# Patient Record
Sex: MALE | Race: WHITE | NOT HISPANIC OR LATINO
[De-identification: names, ages, dates, MRNs, and addresses within clinical notes are randomized per-mention and may not be internally consistent; named-entity substitution may affect disease eponyms.]

---

## 2018-09-06 ENCOUNTER — RESULT REVIEW (OUTPATIENT)
Age: 73
End: 2018-09-06

## 2018-09-06 ENCOUNTER — INPATIENT (INPATIENT)
Facility: HOSPITAL | Age: 73
LOS: 3 days | Discharge: HOME | End: 2018-09-10
Attending: INTERNAL MEDICINE | Admitting: INTERNAL MEDICINE
Payer: MEDICARE

## 2018-09-06 VITALS — WEIGHT: 169.98 LBS | HEIGHT: 66 IN

## 2018-09-06 DIAGNOSIS — Z96.642 PRESENCE OF LEFT ARTIFICIAL HIP JOINT: Chronic | ICD-10-CM

## 2018-09-06 LAB
ALBUMIN SERPL ELPH-MCNC: 2.8 G/DL — LOW (ref 3.3–5)
ALP SERPL-CCNC: 227 U/L — HIGH (ref 40–120)
ALT FLD-CCNC: 63 U/L — SIGNIFICANT CHANGE UP (ref 12–78)
ANION GAP SERPL CALC-SCNC: 13 MMOL/L — SIGNIFICANT CHANGE UP (ref 5–17)
APPEARANCE UR: CLEAR — SIGNIFICANT CHANGE UP
APTT BLD: 31.7 SEC — SIGNIFICANT CHANGE UP (ref 27.5–37.4)
AST SERPL-CCNC: 44 U/L — HIGH (ref 15–37)
B PERT IGG+IGM PNL SER: ABNORMAL
BASOPHILS # BLD AUTO: 0.02 K/UL — SIGNIFICANT CHANGE UP (ref 0–0.2)
BASOPHILS NFR BLD AUTO: 0.2 % — SIGNIFICANT CHANGE UP (ref 0–2)
BILIRUB SERPL-MCNC: 1.1 MG/DL — SIGNIFICANT CHANGE UP (ref 0.2–1.2)
BILIRUB UR-MCNC: ABNORMAL
BUN SERPL-MCNC: 23 MG/DL — SIGNIFICANT CHANGE UP (ref 7–23)
CALCIUM SERPL-MCNC: 9.7 MG/DL — SIGNIFICANT CHANGE UP (ref 8.5–10.1)
CHLORIDE SERPL-SCNC: 101 MMOL/L — SIGNIFICANT CHANGE UP (ref 96–108)
CO2 SERPL-SCNC: 25 MMOL/L — SIGNIFICANT CHANGE UP (ref 22–31)
COLOR FLD: YELLOW — SIGNIFICANT CHANGE UP
COLOR SPEC: YELLOW — SIGNIFICANT CHANGE UP
CREAT SERPL-MCNC: 1.34 MG/DL — HIGH (ref 0.5–1.3)
CRP SERPL-MCNC: 27.26 MG/DL — HIGH (ref 0–0.4)
DIFF PNL FLD: ABNORMAL
EOSINOPHIL # BLD AUTO: 0.02 K/UL — SIGNIFICANT CHANGE UP (ref 0–0.5)
EOSINOPHIL NFR BLD AUTO: 0.2 % — SIGNIFICANT CHANGE UP (ref 0–6)
ERYTHROCYTE [SEDIMENTATION RATE] IN BLOOD: 78 MM/HR — HIGH (ref 0–20)
FLUID INTAKE SUBSTANCE CLASS: SIGNIFICANT CHANGE UP
FLUID SEGMENTED GRANULOCYTES: 94 % — SIGNIFICANT CHANGE UP
GLUCOSE SERPL-MCNC: 105 MG/DL — HIGH (ref 70–99)
GLUCOSE UR QL: NEGATIVE MG/DL — SIGNIFICANT CHANGE UP
GRAM STN FLD: SIGNIFICANT CHANGE UP
HCT VFR BLD CALC: 43.7 % — SIGNIFICANT CHANGE UP (ref 39–50)
HGB BLD-MCNC: 14.4 G/DL — SIGNIFICANT CHANGE UP (ref 13–17)
IMM GRANULOCYTES NFR BLD AUTO: 0.6 % — SIGNIFICANT CHANGE UP (ref 0–1.5)
INR BLD: 1.19 RATIO — HIGH (ref 0.88–1.16)
KETONES UR-MCNC: ABNORMAL
LACTATE SERPL-SCNC: 2.1 MMOL/L — HIGH (ref 0.7–2)
LACTATE SERPL-SCNC: 2.6 MMOL/L — HIGH (ref 0.7–2)
LEUKOCYTE ESTERASE UR-ACNC: ABNORMAL
LYMPHOCYTES # BLD AUTO: 0.84 K/UL — LOW (ref 1–3.3)
LYMPHOCYTES # BLD AUTO: 8.9 % — LOW (ref 13–44)
LYMPHOCYTES # FLD: 2 % — SIGNIFICANT CHANGE UP
MCHC RBC-ENTMCNC: 27.1 PG — SIGNIFICANT CHANGE UP (ref 27–34)
MCHC RBC-ENTMCNC: 33 GM/DL — SIGNIFICANT CHANGE UP (ref 32–36)
MCV RBC AUTO: 82.1 FL — SIGNIFICANT CHANGE UP (ref 80–100)
MONOCYTES # BLD AUTO: 0.88 K/UL — SIGNIFICANT CHANGE UP (ref 0–0.9)
MONOCYTES NFR BLD AUTO: 9.4 % — SIGNIFICANT CHANGE UP (ref 2–14)
MONOS+MACROS # FLD: 4 % — SIGNIFICANT CHANGE UP
NEUTROPHILS # BLD AUTO: 7.59 K/UL — HIGH (ref 1.8–7.4)
NEUTROPHILS NFR BLD AUTO: 80.7 % — HIGH (ref 43–77)
NITRITE UR-MCNC: POSITIVE
PH UR: 5 — SIGNIFICANT CHANGE UP (ref 5–8)
PLATELET # BLD AUTO: 374 K/UL — SIGNIFICANT CHANGE UP (ref 150–400)
POTASSIUM SERPL-MCNC: 3.9 MMOL/L — SIGNIFICANT CHANGE UP (ref 3.5–5.3)
POTASSIUM SERPL-SCNC: 3.9 MMOL/L — SIGNIFICANT CHANGE UP (ref 3.5–5.3)
PROT SERPL-MCNC: 8.5 GM/DL — HIGH (ref 6–8.3)
PROT UR-MCNC: 500 MG/DL
PROTHROM AB SERPL-ACNC: 12.9 SEC — HIGH (ref 9.8–12.7)
RBC # BLD: 5.32 M/UL — SIGNIFICANT CHANGE UP (ref 4.2–5.8)
RBC # FLD: 13.2 % — SIGNIFICANT CHANGE UP (ref 10.3–14.5)
RCV VOL RI: HIGH /UL (ref 0–5)
SODIUM SERPL-SCNC: 139 MMOL/L — SIGNIFICANT CHANGE UP (ref 135–145)
SP GR SPEC: 1.02 — SIGNIFICANT CHANGE UP (ref 1.01–1.02)
SPECIMEN SOURCE: SIGNIFICANT CHANGE UP
TOTAL NUCLEATED CELL COUNT, BODY FLUID: HIGH /UL (ref 0–5)
TUBE TYPE: SIGNIFICANT CHANGE UP
UROBILINOGEN FLD QL: 12 MG/DL
WBC # BLD: 9.41 K/UL — SIGNIFICANT CHANGE UP (ref 3.8–10.5)
WBC # FLD AUTO: 9.41 K/UL — SIGNIFICANT CHANGE UP (ref 3.8–10.5)

## 2018-09-06 PROCEDURE — 88304 TISSUE EXAM BY PATHOLOGIST: CPT | Mod: 26

## 2018-09-06 PROCEDURE — 99285 EMERGENCY DEPT VISIT HI MDM: CPT

## 2018-09-06 PROCEDURE — 93010 ELECTROCARDIOGRAM REPORT: CPT

## 2018-09-06 PROCEDURE — 73562 X-RAY EXAM OF KNEE 3: CPT | Mod: 26,RT

## 2018-09-06 PROCEDURE — 71045 X-RAY EXAM CHEST 1 VIEW: CPT | Mod: 26

## 2018-09-06 PROCEDURE — 99239 HOSP IP/OBS DSCHRG MGMT >30: CPT

## 2018-09-06 RX ORDER — OXYCODONE HYDROCHLORIDE 5 MG/1
5 TABLET ORAL ONCE
Qty: 0 | Refills: 0 | Status: DISCONTINUED | OUTPATIENT
Start: 2018-09-06 | End: 2018-09-06

## 2018-09-06 RX ORDER — CEFAZOLIN SODIUM 1 G
1000 VIAL (EA) INJECTION EVERY 8 HOURS
Qty: 0 | Refills: 0 | Status: DISCONTINUED | OUTPATIENT
Start: 2018-09-06 | End: 2018-09-07

## 2018-09-06 RX ORDER — METOPROLOL TARTRATE 50 MG
25 TABLET ORAL
Qty: 0 | Refills: 0 | Status: DISCONTINUED | OUTPATIENT
Start: 2018-09-06 | End: 2018-09-08

## 2018-09-06 RX ORDER — ACETAMINOPHEN 500 MG
650 TABLET ORAL EVERY 6 HOURS
Qty: 0 | Refills: 0 | Status: DISCONTINUED | OUTPATIENT
Start: 2018-09-06 | End: 2018-09-10

## 2018-09-06 RX ORDER — VANCOMYCIN HCL 1 G
1000 VIAL (EA) INTRAVENOUS EVERY 12 HOURS
Qty: 0 | Refills: 0 | Status: DISCONTINUED | OUTPATIENT
Start: 2018-09-07 | End: 2018-09-09

## 2018-09-06 RX ORDER — DOCUSATE SODIUM 100 MG
100 CAPSULE ORAL THREE TIMES A DAY
Qty: 0 | Refills: 0 | Status: DISCONTINUED | OUTPATIENT
Start: 2018-09-06 | End: 2018-09-10

## 2018-09-06 RX ORDER — FENTANYL CITRATE 50 UG/ML
50 INJECTION INTRAVENOUS
Qty: 0 | Refills: 0 | Status: DISCONTINUED | OUTPATIENT
Start: 2018-09-06 | End: 2018-09-06

## 2018-09-06 RX ORDER — SODIUM CHLORIDE 9 MG/ML
1000 INJECTION, SOLUTION INTRAVENOUS
Qty: 0 | Refills: 0 | Status: DISCONTINUED | OUTPATIENT
Start: 2018-09-06 | End: 2018-09-10

## 2018-09-06 RX ORDER — ASPIRIN/CALCIUM CARB/MAGNESIUM 324 MG
325 TABLET ORAL DAILY
Qty: 0 | Refills: 0 | Status: DISCONTINUED | OUTPATIENT
Start: 2018-09-06 | End: 2018-09-10

## 2018-09-06 RX ORDER — SODIUM CHLORIDE 9 MG/ML
500 INJECTION INTRAMUSCULAR; INTRAVENOUS; SUBCUTANEOUS ONCE
Qty: 0 | Refills: 0 | Status: COMPLETED | OUTPATIENT
Start: 2018-09-06 | End: 2018-09-06

## 2018-09-06 RX ORDER — ONDANSETRON 8 MG/1
4 TABLET, FILM COATED ORAL ONCE
Qty: 0 | Refills: 0 | Status: DISCONTINUED | OUTPATIENT
Start: 2018-09-06 | End: 2018-09-06

## 2018-09-06 RX ORDER — ONDANSETRON 8 MG/1
4 TABLET, FILM COATED ORAL EVERY 6 HOURS
Qty: 0 | Refills: 0 | Status: DISCONTINUED | OUTPATIENT
Start: 2018-09-06 | End: 2018-09-10

## 2018-09-06 RX ORDER — MORPHINE SULFATE 50 MG/1
2 CAPSULE, EXTENDED RELEASE ORAL
Qty: 0 | Refills: 0 | Status: DISCONTINUED | OUTPATIENT
Start: 2018-09-06 | End: 2018-09-10

## 2018-09-06 RX ORDER — SENNA PLUS 8.6 MG/1
2 TABLET ORAL AT BEDTIME
Qty: 0 | Refills: 0 | Status: DISCONTINUED | OUTPATIENT
Start: 2018-09-06 | End: 2018-09-10

## 2018-09-06 RX ORDER — INFLUENZA VIRUS VACCINE 15; 15; 15; 15 UG/.5ML; UG/.5ML; UG/.5ML; UG/.5ML
0.5 SUSPENSION INTRAMUSCULAR ONCE
Qty: 0 | Refills: 0 | Status: COMPLETED | OUTPATIENT
Start: 2018-09-06 | End: 2018-09-06

## 2018-09-06 RX ORDER — DOCUSATE SODIUM 100 MG
100 CAPSULE ORAL THREE TIMES A DAY
Qty: 0 | Refills: 0 | Status: DISCONTINUED | OUTPATIENT
Start: 2018-09-06 | End: 2018-09-07

## 2018-09-06 RX ORDER — SODIUM CHLORIDE 9 MG/ML
1000 INJECTION, SOLUTION INTRAVENOUS
Qty: 0 | Refills: 0 | Status: DISCONTINUED | OUTPATIENT
Start: 2018-09-06 | End: 2018-09-06

## 2018-09-06 RX ORDER — ATORVASTATIN CALCIUM 80 MG/1
80 TABLET, FILM COATED ORAL AT BEDTIME
Qty: 0 | Refills: 0 | Status: DISCONTINUED | OUTPATIENT
Start: 2018-09-06 | End: 2018-09-10

## 2018-09-06 RX ORDER — ACETAMINOPHEN 500 MG
1000 TABLET ORAL ONCE
Qty: 0 | Refills: 0 | Status: COMPLETED | OUTPATIENT
Start: 2018-09-06 | End: 2018-09-06

## 2018-09-06 RX ORDER — MORPHINE SULFATE 50 MG/1
4 CAPSULE, EXTENDED RELEASE ORAL EVERY 4 HOURS
Qty: 0 | Refills: 0 | Status: DISCONTINUED | OUTPATIENT
Start: 2018-09-06 | End: 2018-09-06

## 2018-09-06 RX ORDER — OXYCODONE AND ACETAMINOPHEN 5; 325 MG/1; MG/1
1 TABLET ORAL EVERY 4 HOURS
Qty: 0 | Refills: 0 | Status: DISCONTINUED | OUTPATIENT
Start: 2018-09-06 | End: 2018-09-10

## 2018-09-06 RX ADMIN — ATORVASTATIN CALCIUM 80 MILLIGRAM(S): 80 TABLET, FILM COATED ORAL at 22:37

## 2018-09-06 RX ADMIN — Medication 100 MILLIGRAM(S): at 22:37

## 2018-09-06 RX ADMIN — SODIUM CHLORIDE 500 MILLILITER(S): 9 INJECTION INTRAMUSCULAR; INTRAVENOUS; SUBCUTANEOUS at 13:04

## 2018-09-06 RX ADMIN — SODIUM CHLORIDE 75 MILLILITER(S): 9 INJECTION, SOLUTION INTRAVENOUS at 21:18

## 2018-09-06 RX ADMIN — Medication 400 MILLIGRAM(S): at 16:51

## 2018-09-06 RX ADMIN — Medication 25 MILLIGRAM(S): at 22:37

## 2018-09-06 RX ADMIN — Medication 1000 MILLIGRAM(S): at 16:54

## 2018-09-06 NOTE — H&P ADULT - ASSESSMENT
#RT septic knee  Admit to med-surg  Ortho eval appreciated  NPO for OR today for knee wash out  Blood c/s  Wound c/s from OR  ID eval for IV abx post-procedure  Patient is medically optimized for OR  Pt advised that he may need to stay in the hospital till OR cultures finalized and need for PICC line and IV abx upon discharge assessed    #HTN/hyperlipidemia  Stable    #Dispo- inpatient admit. Patient is medically optimized for OR

## 2018-09-06 NOTE — H&P ADULT - NSHPPHYSICALEXAM_GEN_ALL_CORE
Vital Signs Last 24 Hrs  T(C): 36.8 (06 Sep 2018 12:24), Max: 36.8 (06 Sep 2018 12:24)  T(F): 98.2 (06 Sep 2018 12:24), Max: 98.2 (06 Sep 2018 12:24)  HR: 80 (06 Sep 2018 12:24) (80 - 80)  BP: 139/84 (06 Sep 2018 12:24) (139/84 - 139/84)  BP(mean): --  RR: 17 (06 Sep 2018 12:24) (17 - 17)  SpO2: 98% (06 Sep 2018 12:24) (98% - 98%)

## 2018-09-06 NOTE — ED STATDOCS - ATTENDING CONTRIBUTION TO CARE
I, Opal Roa MD, personally saw the patient with ACP.  I have personally performed a face to face diagnostic evaluation on this patient.  I have reviewed the ACP note and agree with the history, exam, and plan of care, except as noted.

## 2018-09-06 NOTE — ED STATDOCS - SKIN, MLM
skin normal color for race, warm, dry and intact. Right knee swollen ecchymosis and bruising along right thigh.

## 2018-09-06 NOTE — ED ADULT NURSE NOTE - OBJECTIVE STATEMENT
Pt had surgery 8/23 for torn miniscus in right knee. Pt states having pain and swelling which has worsened since the surgery. Pt has been back to his orthopedist 2x for aspiration from knee Pt had surgery 8/23 for torn miniscus in right knee. Pt states having pain and swelling which has worsened since the surgery. Pt has been back to his orthopedist 2x for aspiration from knee. Pt send to hospital today from md after aspiration. Pt complains of knee pain and fever of 102 last night. Knee appears swollen and red. Pt denies chest pain and sob.

## 2018-09-06 NOTE — BRIEF OPERATIVE NOTE - POST-OP DX
Pyogenic arthritis of right knee joint, due to unspecified organism  09/06/2018    Active  Jennifer Travis

## 2018-09-06 NOTE — ED STATDOCS - OBJECTIVE STATEMENT
72 y/o male with a PMHx of HTN, HLD presents to the ED s/p knee surgery. Pt had surgery done by Dr. Vázquez on August 27th, 2018. Pt notes after surgery, pt had pain. Pt had knee drained twice. Went to Dr. Andrés Vázquez this morning, had knee drained. Dr. Vázquez sent pt to ED. +fever (Tmax 102F).

## 2018-09-06 NOTE — ED ADULT TRIAGE NOTE - CHIEF COMPLAINT QUOTE
Pt states he was at Dr Vázquez's office and he had fluid drawn off right knee. MD sent pt to ED for "knee wash" Pt had surgery on 8/27

## 2018-09-06 NOTE — BRIEF OPERATIVE NOTE - PROCEDURE
<<-----Click on this checkbox to enter Procedure Arthroscopic drainage of knee  09/06/2018    Active  [email or web address]

## 2018-09-06 NOTE — PROGRESS NOTE ADULT - SUBJECTIVE AND OBJECTIVE BOX
pt seen at bedside comfortable in NAD, pain right knee minimal controlled with medication. Denies N/T RLE     PE Right knee   dressing c/d/i   compartments soft non tender  FROM ankle and toes  + EHL FHL GS TA   SILT   Dp intact

## 2018-09-06 NOTE — ED ADULT NURSE NOTE - NSIMPLEMENTINTERV_GEN_ALL_ED
Implemented All Fall with Harm Risk Interventions:  [place] to call system. Call bell, personal items and telephone within reach. Instruct patient to call for assistance. Room bathroom lighting operational. Non-slip footwear when patient is off stretcher. Physically safe environment: no spills, clutter or unnecessary equipment. Stretcher in lowest position, wheels locked, appropriate side rails in place. Provide visual cue, wrist band, yellow gown, etc. Monitor gait and stability. Monitor for mental status changes and reorient to person, place, and time. Review medications for side effects contributing to fall risk. Reinforce activity limits and safety measures with patient and family. Provide visual clues: red socks.

## 2018-09-06 NOTE — ED STATDOCS - PROGRESS NOTE DETAILS
74 y/o M with PMH of HTN, HLD, recent right knee meniscectomy on 8/27/18 presents with right knee pain/swelling and fever at home Tmax 102F. Pt states he's had knee drained 3x since procedure, most recently this AM. States Dr. Vázquez was concerned and ad 72 y/o M with PMH of HTN, HLD, recent right knee meniscectomy on 8/27/18 presents with right knee pain/swelling and fever at home Tmax 102F. Pt states he's had knee drained 3x since procedure, most recently this AM. States Dr. Vázquez was concerned and advised coming to ED for further evaluation. Pt believes he is to have knee washed out. PE: right knee edema with ecchymosis to medial thigh. Surgical incisions appear to be healing well with no drainage or surrounding erythema. Diffuse tenderness to palpation in right knee. Limited right knee ROM. Cardiac: s1/s2, RRR. Lungs: CTAB. Abdomen: soft, nontender, NBS x4. A/P: Pt sent to ED for knee wash out. Will check labs, page Dr. Vázquez. - Mic Rooney PA-C

## 2018-09-06 NOTE — H&P ADULT - NSHPLABSRESULTS_GEN_ALL_CORE
14.4   9.41  )-----------( 374      ( 06 Sep 2018 13:11 )             43.7     06 Sep 2018 13:11    139    |  101    |  23     ----------------------------<  105    3.9     |  25     |  1.34     Ca    9.7        06 Sep 2018 13:11    TPro  8.5    /  Alb  2.8    /  TBili  1.1    /  DBili  x      /  AST  44     /  ALT  63     /  AlkPhos  227    06 Sep 2018 13:11    LIVER FUNCTIONS - ( 06 Sep 2018 13:11 )  Alb: 2.8 g/dL / Pro: 8.5 gm/dL / ALK PHOS: 227 U/L / ALT: 63 U/L / AST: 44 U/L / GGT: x           PT/INR - ( 06 Sep 2018 13:11 )   PT: 12.9 sec;   INR: 1.19 ratio       PTT - ( 06 Sep 2018 13:11 )  PTT:31.7 sec    Urinalysis Basic - ( 06 Sep 2018 13:11 )  Color: Yellow / Appearance: Clear / S.025 / pH: x  Gluc: x / Ketone: Trace  / Bili: Moderate / Urobili: 12 mg/dL   Blood: x / Protein: 500 mg/dL / Nitrite: Positive   Leuk Esterase: Trace / RBC: x / WBC x   Sq Epi: x / Non Sq Epi: x / Bacteria: x

## 2018-09-06 NOTE — PROGRESS NOTE ADULT - ASSESSMENT
A: 73m s/p Right knee I&D for septic arthritis     P:  WBAT RLE   therapy   DVT ppx   venodynes  incentive spirometer  IV abx   follow ID consult   F/U Cx joint fluid

## 2018-09-07 LAB
ANION GAP SERPL CALC-SCNC: 9 MMOL/L — SIGNIFICANT CHANGE UP (ref 5–17)
BASOPHILS # BLD AUTO: 0.01 K/UL — SIGNIFICANT CHANGE UP (ref 0–0.2)
BASOPHILS NFR BLD AUTO: 0.1 % — SIGNIFICANT CHANGE UP (ref 0–2)
BUN SERPL-MCNC: 23 MG/DL — SIGNIFICANT CHANGE UP (ref 7–23)
CALCIUM SERPL-MCNC: 9.2 MG/DL — SIGNIFICANT CHANGE UP (ref 8.5–10.1)
CHLORIDE SERPL-SCNC: 106 MMOL/L — SIGNIFICANT CHANGE UP (ref 96–108)
CO2 SERPL-SCNC: 26 MMOL/L — SIGNIFICANT CHANGE UP (ref 22–31)
CREAT SERPL-MCNC: 1.17 MG/DL — SIGNIFICANT CHANGE UP (ref 0.5–1.3)
EOSINOPHIL # BLD AUTO: 0 K/UL — SIGNIFICANT CHANGE UP (ref 0–0.5)
EOSINOPHIL NFR BLD AUTO: 0 % — SIGNIFICANT CHANGE UP (ref 0–6)
GLUCOSE SERPL-MCNC: 157 MG/DL — HIGH (ref 70–99)
HCT VFR BLD CALC: 36.8 % — LOW (ref 39–50)
HGB BLD-MCNC: 12.2 G/DL — LOW (ref 13–17)
IMM GRANULOCYTES NFR BLD AUTO: 0.4 % — SIGNIFICANT CHANGE UP (ref 0–1.5)
LYMPHOCYTES # BLD AUTO: 0.84 K/UL — LOW (ref 1–3.3)
LYMPHOCYTES # BLD AUTO: 8.9 % — LOW (ref 13–44)
MCHC RBC-ENTMCNC: 27.1 PG — SIGNIFICANT CHANGE UP (ref 27–34)
MCHC RBC-ENTMCNC: 33.2 GM/DL — SIGNIFICANT CHANGE UP (ref 32–36)
MCV RBC AUTO: 81.8 FL — SIGNIFICANT CHANGE UP (ref 80–100)
MONOCYTES # BLD AUTO: 0.63 K/UL — SIGNIFICANT CHANGE UP (ref 0–0.9)
MONOCYTES NFR BLD AUTO: 6.7 % — SIGNIFICANT CHANGE UP (ref 2–14)
NEUTROPHILS # BLD AUTO: 7.95 K/UL — HIGH (ref 1.8–7.4)
NEUTROPHILS NFR BLD AUTO: 83.9 % — HIGH (ref 43–77)
NRBC # BLD: 0 /100 WBCS — SIGNIFICANT CHANGE UP (ref 0–0)
PLATELET # BLD AUTO: 301 K/UL — SIGNIFICANT CHANGE UP (ref 150–400)
POTASSIUM SERPL-MCNC: 4.4 MMOL/L — SIGNIFICANT CHANGE UP (ref 3.5–5.3)
POTASSIUM SERPL-SCNC: 4.4 MMOL/L — SIGNIFICANT CHANGE UP (ref 3.5–5.3)
RBC # BLD: 4.5 M/UL — SIGNIFICANT CHANGE UP (ref 4.2–5.8)
RBC # FLD: 13.2 % — SIGNIFICANT CHANGE UP (ref 10.3–14.5)
SODIUM SERPL-SCNC: 141 MMOL/L — SIGNIFICANT CHANGE UP (ref 135–145)
WBC # BLD: 9.47 K/UL — SIGNIFICANT CHANGE UP (ref 3.8–10.5)
WBC # FLD AUTO: 9.47 K/UL — SIGNIFICANT CHANGE UP (ref 3.8–10.5)

## 2018-09-07 RX ORDER — CEFTRIAXONE 500 MG/1
2000 INJECTION, POWDER, FOR SOLUTION INTRAMUSCULAR; INTRAVENOUS ONCE
Qty: 0 | Refills: 0 | Status: COMPLETED | OUTPATIENT
Start: 2018-09-07 | End: 2018-09-07

## 2018-09-07 RX ORDER — CEFTRIAXONE 500 MG/1
INJECTION, POWDER, FOR SOLUTION INTRAMUSCULAR; INTRAVENOUS
Qty: 0 | Refills: 0 | Status: DISCONTINUED | OUTPATIENT
Start: 2018-09-07 | End: 2018-09-10

## 2018-09-07 RX ORDER — CEFTRIAXONE 500 MG/1
2000 INJECTION, POWDER, FOR SOLUTION INTRAMUSCULAR; INTRAVENOUS EVERY 24 HOURS
Qty: 0 | Refills: 0 | Status: DISCONTINUED | OUTPATIENT
Start: 2018-09-08 | End: 2018-09-10

## 2018-09-07 RX ADMIN — Medication 100 MILLIGRAM(S): at 11:59

## 2018-09-07 RX ADMIN — Medication 100 MILLIGRAM(S): at 06:56

## 2018-09-07 RX ADMIN — Medication 100 MILLIGRAM(S): at 05:30

## 2018-09-07 RX ADMIN — Medication 1 TABLET(S): at 11:59

## 2018-09-07 RX ADMIN — Medication 25 MILLIGRAM(S): at 21:13

## 2018-09-07 RX ADMIN — Medication 250 MILLIGRAM(S): at 05:28

## 2018-09-07 RX ADMIN — CEFTRIAXONE 2000 MILLIGRAM(S): 500 INJECTION, POWDER, FOR SOLUTION INTRAMUSCULAR; INTRAVENOUS at 11:58

## 2018-09-07 RX ADMIN — Medication 250 MILLIGRAM(S): at 17:37

## 2018-09-07 RX ADMIN — Medication 25 MILLIGRAM(S): at 05:30

## 2018-09-07 RX ADMIN — ATORVASTATIN CALCIUM 80 MILLIGRAM(S): 80 TABLET, FILM COATED ORAL at 21:13

## 2018-09-07 RX ADMIN — Medication 325 MILLIGRAM(S): at 12:00

## 2018-09-07 NOTE — PHYSICAL THERAPY INITIAL EVALUATION ADULT - IMPAIRMENTS FOUND, PT EVAL
The pt is impulsive at timed with movements/ROM/poor safety awareness/gait, locomotion, and balance/posture

## 2018-09-07 NOTE — PHYSICAL THERAPY INITIAL EVALUATION ADULT - MODALITIES TREATMENT COMMENTS
The pt demonstrated good overall activity tolerance, responding well to therex review, transfer and ambulation tx and stair tx using axillary crutches. The pt was returned to supine and adjusted for comfort.  RN aware. CB, tray and phone in place. The pt was in NAD at end of tx.

## 2018-09-07 NOTE — PROGRESS NOTE ADULT - SUBJECTIVE AND OBJECTIVE BOX
c/c: right knee erythema/swelling/pain      HPI:  72yo/M with PMH HTN, hyperlipidemia presented for evaluation of RT knee swelling and low-grade temp. Patient fell and had torn meniscus for which he had RT knee arthroscopic surgery on 18. Few days after surgery he noticed that knee was swollen and went to see DR Vázquez in the office, had knee tap and was told cultures were negative. However, he continued to have worsening of swelling and started having low-grade temps with some nausea. He went to see DR Vázquez  and had another knee tap and was told by DR Vázquez that fluid "did not look good" and he needs to be admitted for knee wash out in OR. Knee was visibly swollen but not actively draining. He was admitted with acute septic arthritis of right knee. Underwent washout in OR : pt seen and examined this am. Felt well. No pain at the time. No sob/chest pain. No n/v/d. tolerating po.    Review of system- All 10 systems reviewed and is as per HPI otherwise negative.       VITALS  T(C): 36.8 (18 @ 11:30), Max: 37.2 (18 @ 16:23)  HR: 70 (18 @ 11:30) (64 - 76)  BP: 150/75 (18 @ 11:30) (119/62 - 150/75)  RR: 17 (18 @ 11:30) (11 - 18)  SpO2: 98% (18 @ 11:30) (92% - 100%)      PHYSICAL EXAM:    GENERAL: Comfortable, no acute distress  HEAD:  Atraumatic, Normocephalic  EYES: EOMI, PERRLA  HEENT: Moist mucous membranes  NECK: Supple, No JVD  NERVOUS SYSTEM:  Alert & Oriented X3, Motor Strength 5/5 B/L upper and lower extremities  CHEST/LUNG: Clear to auscultation bilaterally  HEART: Regular rate and rhythm; No murmurs, rubs, or gallops  ABDOMEN: Soft, Nontender, Nondistended; Bowel sounds present  GENITOURINARY- Voiding, no palpable bladder  EXTREMITIES:  No clubbing, cyanosis, or edema  MUSCULOSKELETAL- Right knee in ace-wrap  SKIN-no rash        LABS:                        12.2   9.47  )-----------( 301      ( 07 Sep 2018 06:58 )             36.8         141  |  106  |  23  ----------------------------<  157<H>  4.4   |  26  |  1.17    Ca    9.2      07 Sep 2018 06:58    TPro  8.5<H>  /  Alb  2.8<L>  /  TBili  1.1  /  DBili  x   /  AST  44<H>  /  ALT  63  /  AlkPhos  227<H>      PT/INR - ( 06 Sep 2018 13:11 )   PT: 12.9 sec;   INR: 1.19 ratio         PTT - ( 06 Sep 2018 13:11 )  PTT:31.7 sec  Urinalysis Basic - ( 06 Sep 2018 13:11 )    Color: Yellow / Appearance: Clear / S.025 / pH: x  Gluc: x / Ketone: Trace  / Bili: Moderate / Urobili: 12 mg/dL   Blood: x / Protein: 500 mg/dL / Nitrite: Positive   Leuk Esterase: Trace / RBC: 3-5 /HPF / WBC 11-25   Sq Epi: x / Non Sq Epi: Occasional / Bacteria: Moderate      MEDS  acetaminophen   Tablet .. 650 milliGRAM(s) Oral every 6 hours PRN  aluminum hydroxide/magnesium hydroxide/simethicone Suspension 30 milliLiter(s) Oral every 4 hours PRN  aspirin enteric coated 325 milliGRAM(s) Oral daily  atorvastatin 80 milliGRAM(s) Oral at bedtime  cefTRIAXone Injectable.      docusate sodium 100 milliGRAM(s) Oral three times a day  influenza   Vaccine 0.5 milliLiter(s) IntraMuscular once  lactated ringers. 1000 milliLiter(s) IV Continuous <Continuous>  metoprolol tartrate 25 milliGRAM(s) Oral two times a day  morphine  - Injectable 2 milliGRAM(s) SubCutaneous every 3 hours PRN  multivitamin 1 Tablet(s) Oral daily  ondansetron Injectable 4 milliGRAM(s) IV Push every 6 hours PRN  ondansetron Injectable 4 milliGRAM(s) IV Push every 6 hours PRN  oxyCODONE    5 mG/acetaminophen 325 mG 1 Tablet(s) Oral every 4 hours PRN  senna 2 Tablet(s) Oral at bedtime PRN  vancomycin  IVPB 1000 milliGRAM(s) IV Intermittent every 12 hours    ASSESSMENT AND PLAN:  73M, pmh as above a/w:    1. Acute septic arthritis right knee:  -s/p I+D  -f/u outpt and OR cultures  -iv abx. per ID    2. HTN:  -continue bb    3. HLD:  -statin    4. Elevated lactate:  -not clinically significant.    5. DVT px

## 2018-09-07 NOTE — PHYSICAL THERAPY INITIAL EVALUATION ADULT - ADDITIONAL COMMENTS
Pt reports that he has been using axillary crutches at home for the past 2 weeks. Steps with unilateral rails at home.

## 2018-09-07 NOTE — PROGRESS NOTE ADULT - ASSESSMENT
73M s/p arthroscopic I&D of right knee POD1  FU Cx  Abx  Analgesia  DVT ppx  Incentive spirometry  WBAT  P/T  Discharge planning

## 2018-09-07 NOTE — PHYSICAL THERAPY INITIAL EVALUATION ADULT - PERTINENT HX OF CURRENT PROBLEM, REHAB EVAL
73 y.o. male with septic right knee - 72yo/M with PMH HTN, hyperlipidemia presented for evaluation of RT knee swelling and low-grade temp. Patient fell and had torn meniscus for which he had RT knee arthroscopic surgery on 8/26/18. Few days after surgery he noticed that knee was swollen.

## 2018-09-07 NOTE — CONSULT NOTE ADULT - SUBJECTIVE AND OBJECTIVE BOX
BUD FLOYD    [id number]    History:  73yMale  PMH below with complaint of Right knee pain and swelling. This began about 1 week ago. He is sp Right knee arthroscopy on 8/27/18 with Dr. Vázquez. after surgery he developed increasing pain and swelling. His knee was aspirated in the office for this a couple of times, the latest being today. He has also been having chills at home. Cultures obtained from the office last week pt was told negative. Today he is sent in with cloudy fluid in a lavender tube and swab culture from Dr. Vázquez's office. We spoke with Gurpreet Holley to confirm this. The tube was delivered to the lab here at the hospital for cell count analysis. No trauma. Denies nausea, vomiting, chest pain, shortness of breath, abdominal pain or fever. No acute motor or sensory changes are reported. He has not been on any antibiotics recently.     KNEE EFFUSION  Handoff  MEWS Score  HLD (hyperlipidemia)  HTN (hypertension)  Knee effusion, right  RIGHT KNEE FLUSH    S/P Left LEXX  s/p Left shoulder surgery RTCR    Social Hx: Retired from [employer], He is active and plays [employer] prior to surgery.      Vital Signs Last 24 Hrs  T(C): 36.8 (06 Sep 2018 12:24), Max: 36.8 (06 Sep 2018 12:24)  T(F): 98.2 (06 Sep 2018 12:24), Max: 98.2 (06 Sep 2018 12:24)  HR: 80 (06 Sep 2018 12:24) (80 - 80)  BP: 139/84 (06 Sep 2018 12:24) (139/84 - 139/84)  BP(mean): --  RR: 17 (06 Sep 2018 12:24) (17 - 17)  SpO2: 98% (06 Sep 2018 12:24) (98% - 98%)                          14.4   9.41  )-----------( 374      ( 06 Sep 2018 13:11 )             43.7           PT/INR - ( 06 Sep 2018 13:11 )   PT: 12.9 sec;   INR: 1.19 ratio         PTT - ( 06 Sep 2018 13:11 )  PTT:31.7 sec  MEDICATIONS  (STANDING):  atorvastatin 80 milliGRAM(s) Oral at bedtime  docusate sodium 100 milliGRAM(s) Oral three times a day  lactated ringers. 1000 milliLiter(s) (75 mL/Hr) IV Continuous <Continuous>  metoprolol tartrate 25 milliGRAM(s) Oral two times a day    MEDICATIONS  (PRN):  acetaminophen   Tablet .. 650 milliGRAM(s) Oral every 6 hours PRN Temp greater or equal to 38C (100.4F), Mild Pain (1 - 3)  aluminum hydroxide/magnesium hydroxide/simethicone Suspension 30 milliLiter(s) Oral every 4 hours PRN Dyspepsia  ondansetron Injectable 4 milliGRAM(s) IV Push every 6 hours PRN Nausea  senna 2 Tablet(s) Oral at bedtime PRN Constipation      Imaging: Right knee XR pending     Physical exam:   NAD Alert Awake, Calm and pleasant. Nonseptic appearing.  There is swelling over the right knee and palpable effusion. Healed incisions noted, no drainage. Moderate warmth. There is mild tenderness. Compartments are soft. Sensation to light touch is intact of digits distally. Neurologically without focal deficit. Motion is mildly limited as a result of pain. No focal motor weaknesses are appreciated. + DP pulse. Capillary refill brisk less than 2 seconds. No cyanosis.    Primary Orthopedic Assessment:  • Infection of Right knee, septic arthritis     Plan:   -NPO for OR for I&D of Right knee, arthroscopic  -Pt signed consent, on chart  -Hold IV antibiotics, ID Consult  -Elevation of the affected extremity  -Analgesia  -OR today with Dr. Vázquez  -Discussed with Dr. Vázquez
Patient is a 73y old  Male who presents with a chief complaint of RT septic knee (07 Sep 2018 06:24)    HPI:  74 yo Male with h/o HTN, hyperlipidemia was admitted on  for right knee swelling and low-grade temp. Patient fell and had torn meniscus for which he had RT knee arthroscopic surgery on 18. Few days after surgery he noticed that knee was swollen and went to see DR Vázquez in the office, had knee tap and was told cultures were negative. However, he continued to have worsening of swelling and started having low-grade temps with some nausea. He went to see Dr Vázquez on , had another knee tap and was told that fluid "did not look good" and he needs to be admitted for knee wash out in OR. Knee is visibly swollen but not actively draining. He received vancomycin IV and cefazolin.      PMH: as above  PSH: as above  Meds: per reconciliation sheet, noted below  MEDICATIONS  (STANDING):  aspirin enteric coated 325 milliGRAM(s) Oral daily  atorvastatin 80 milliGRAM(s) Oral at bedtime  ceFAZolin   IVPB 1000 milliGRAM(s) IV Intermittent every 8 hours  docusate sodium 100 milliGRAM(s) Oral three times a day  docusate sodium Liquid 100 milliGRAM(s) Oral three times a day  influenza   Vaccine 0.5 milliLiter(s) IntraMuscular once  lactated ringers. 1000 milliLiter(s) (75 mL/Hr) IV Continuous <Continuous>  metoprolol tartrate 25 milliGRAM(s) Oral two times a day  multivitamin 1 Tablet(s) Oral daily  vancomycin  IVPB 1000 milliGRAM(s) IV Intermittent every 12 hours    MEDICATIONS  (PRN):  acetaminophen   Tablet .. 650 milliGRAM(s) Oral every 6 hours PRN Temp greater or equal to 38C (100.4F), Mild Pain (1 - 3)  aluminum hydroxide/magnesium hydroxide/simethicone Suspension 30 milliLiter(s) Oral every 4 hours PRN Dyspepsia  morphine  - Injectable 2 milliGRAM(s) SubCutaneous every 3 hours PRN Severe Pain (7 - 10)  ondansetron Injectable 4 milliGRAM(s) IV Push every 6 hours PRN Nausea and/or Vomiting  ondansetron Injectable 4 milliGRAM(s) IV Push every 6 hours PRN Nausea  oxyCODONE    5 mG/acetaminophen 325 mG 1 Tablet(s) Oral every 4 hours PRN Moderate Pain (4 - 6)  senna 2 Tablet(s) Oral at bedtime PRN Constipation    Allergies    penicillin (Unknown)    Intolerances      Social: no smoking, no alcohol, no illegal drugs; no recent travel, no exposure to TB  FAMILY HISTORY:  Family history of acute myocardial infarction (Mother)    no history of premature cardiovascular disease in first degree relatives    ROS: the patient denies fever, no chills, no HA, no seizures, no dizziness, no sore throat, no nasal congestion, no blurry vision, no CP, no palpitations, no SOB, no cough, no abdominal pain, no diarrhea, no N/V, no dysuria, no leg pain, no claudication, no rash, has right knee pain, no rectal pain or bleeding, no night sweats  All other systems reviewed and are negative    Vital Signs Last 24 Hrs  T(C): 36.6 (07 Sep 2018 05:26), Max: 37.3 (06 Sep 2018 14:34)  T(F): 97.8 (07 Sep 2018 05:26), Max: 99.1 (06 Sep 2018 14:34)  HR: 68 (07 Sep 2018 05:26) (64 - 80)  BP: 122/71 (07 Sep 2018 05:26) (119/62 - 143/86)  BP(mean): --  RR: 18 (07 Sep 2018 05:26) (11 - 18)  SpO2: 98% (07 Sep 2018 05:26) (92% - 100%)  Daily Height in cm: 167.64 (06 Sep 2018 12:21)    Daily     PE:    Constitutional: frail looking  HEENT: NC/AT, EOMI, PERRLA, conjunctivae clear; ears and nose atraumatic; pharynx benign  Neck: supple; thyroid not palpable  Back: no tenderness  Respiratory: respiratory effort normal; clear to auscultation  Cardiovascular: S1S2 regular, no murmurs  Abdomen: soft, not tender, not distended, positive BS; no liver or spleen organomegaly  Genitourinary: no suprapubic tenderness  Lymphatic: no LN palpable  Musculoskeletal: no muscle tenderness, no joint swelling or tenderness  Extremities: no pedal edema; right knee edema, erythema and reduced ROM  Neurological/ Psychiatric: AxOx3, judgement and insight normal; moving all extremities  Skin: no rashes; no palpable lesions    Labs: all available labs reviewed                        12.2   9.47  )-----------( 301      ( 07 Sep 2018 06:58 )             36.8         141  |  106  |  23  ----------------------------<  157<H>  4.4   |  26  |  1.17    Ca    9.2      07 Sep 2018 06:58    TPro  8.5<H>  /  Alb  2.8<L>  /  TBili  1.1  /  DBili  x   /  AST  44<H>  /  ALT  63  /  AlkPhos  227<H>       LIVER FUNCTIONS - ( 06 Sep 2018 13:11 )  Alb: 2.8 g/dL / Pro: 8.5 gm/dL / ALK PHOS: 227 U/L / ALT: 63 U/L / AST: 44 U/L / GGT: x           Urinalysis Basic - ( 06 Sep 2018 13:11 )    Color: Yellow / Appearance: Clear / S.025 / pH: x  Gluc: x / Ketone: Trace  / Bili: Moderate / Urobili: 12 mg/dL   Blood: x / Protein: 500 mg/dL / Nitrite: Positive   Leuk Esterase: Trace / RBC: 3-5 /HPF / WBC 11-25   Sq Epi: x / Non Sq Epi: Occasional / Bacteria: Moderate        Culture Results:   Testing in progress ( @ 15:39)    Radiology: all available radiological tests reviewed    Advanced directives addressed: full resuscitation

## 2018-09-07 NOTE — CONSULT NOTE ADULT - ASSESSMENT
72 yo Male with h/o HTN, hyperlipidemia was admitted on 9/6 for right knee swelling and low-grade temp. Patient fell and had torn meniscus for which he had RT knee arthroscopic surgery on 8/26/18. Few days after surgery he noticed that knee was swollen and went to see DR Vázquez in the office, had knee tap and was told cultures were negative. However, he continued to have worsening of swelling and started having low-grade temps with some nausea. He went to see Dr Vázquez on 9/6, had another knee tap and was told that fluid "did not look good" and he needs to be admitted for knee wash out in OR. Knee is visibly swollen but not actively draining. He received vancomycin IV and cefazolin. 72 yo Male with h/o HTN, hyperlipidemia, left THR was admitted on 9/6 for right knee swelling and low-grade temp. Patient fell and had torn meniscus for which he had RT knee arthroscopic surgery on 8/26/18. Few days after surgery he noticed that knee was swollen and went to see DR Vázquez in the office, had knee tap and was told cultures were negative. However, he continued to have worsening of swelling and started having low-grade temps with some nausea. He went to see Dr Vázquez on 9/6, had another knee tap and was told that fluid "did not look good" and he needs to be admitted for knee wash out in OR. Knee is visibly swollen but not actively draining. He received vancomycin IV and cefazolin.    1. Right knee arthritis, possible septic arthritis s/p washout. Left knee torn meniscus s/p recent surgery. OA.   -reported with cloudy joint fluid  -cultures collected in office and in OR  -agree with vancomycin 1 gm IV q12h and ceftraixone 2 gm IV qd  -reason for abx use and side effects reviewed with patient; monitor BMP and vancomycin trough levels   -called Dr. Vázquez office to ask for cultures results  -old chart reviewed to assess prior cultures  -monitor temps  -f/u CBC  -supportive care  2. Other issues:   -care per medicine 74 yo Male with h/o HTN, hyperlipidemia, left THR was admitted on 9/6 for right knee swelling and low-grade temp. Patient fell and had torn meniscus for which he had RT knee arthroscopic surgery on 8/26/18. Few days after surgery he noticed that knee was swollen and went to see DR Vázquez in the office, had knee tap and was told cultures were negative. However, he continued to have worsening of swelling and started having low-grade temps with some nausea. He went to see Dr Vázquez on 9/6, had another knee tap and was told that fluid "did not look good" and he needs to be admitted for knee wash out in OR. Knee is visibly swollen but not actively draining. He received vancomycin IV and cefazolin.    1. Right knee pain. Right knee arthritis, possible septic arthritis s/p washout. Left knee torn meniscus s/p recent surgery. OA. Allergy to PCN.  -reported with cloudy joint fluid  -cultures collected in office and in OR  -agree with vancomycin 1 gm IV q12h and ceftraixone 2 gm IV qd  -reason for abx use and side effects reviewed with patient; monitor BMP and vancomycin trough levels   -called Dr. Vázquez office to ask for cultures results  -old chart reviewed to assess prior cultures  -monitor temps  -f/u CBC  -supportive care  2. Other issues:   -care per medicine

## 2018-09-08 LAB
-  AMPICILLIN/SULBACTAM: SIGNIFICANT CHANGE UP
-  AMPICILLIN/SULBACTAM: SIGNIFICANT CHANGE UP
-  CEFAZOLIN: SIGNIFICANT CHANGE UP
-  CEFAZOLIN: SIGNIFICANT CHANGE UP
-  CLINDAMYCIN: SIGNIFICANT CHANGE UP
-  CLINDAMYCIN: SIGNIFICANT CHANGE UP
-  ERYTHROMYCIN: SIGNIFICANT CHANGE UP
-  ERYTHROMYCIN: SIGNIFICANT CHANGE UP
-  GENTAMICIN: SIGNIFICANT CHANGE UP
-  GENTAMICIN: SIGNIFICANT CHANGE UP
-  OXACILLIN: SIGNIFICANT CHANGE UP
-  OXACILLIN: SIGNIFICANT CHANGE UP
-  PENICILLIN: SIGNIFICANT CHANGE UP
-  PENICILLIN: SIGNIFICANT CHANGE UP
-  RIFAMPIN: SIGNIFICANT CHANGE UP
-  RIFAMPIN: SIGNIFICANT CHANGE UP
-  TETRACYCLINE: SIGNIFICANT CHANGE UP
-  TETRACYCLINE: SIGNIFICANT CHANGE UP
-  TRIMETHOPRIM/SULFAMETHOXAZOLE: SIGNIFICANT CHANGE UP
-  TRIMETHOPRIM/SULFAMETHOXAZOLE: SIGNIFICANT CHANGE UP
-  VANCOMYCIN: SIGNIFICANT CHANGE UP
-  VANCOMYCIN: SIGNIFICANT CHANGE UP
ANION GAP SERPL CALC-SCNC: 10 MMOL/L — SIGNIFICANT CHANGE UP (ref 5–17)
BUN SERPL-MCNC: 27 MG/DL — HIGH (ref 7–23)
CALCIUM SERPL-MCNC: 9.2 MG/DL — SIGNIFICANT CHANGE UP (ref 8.5–10.1)
CHLORIDE SERPL-SCNC: 102 MMOL/L — SIGNIFICANT CHANGE UP (ref 96–108)
CO2 SERPL-SCNC: 27 MMOL/L — SIGNIFICANT CHANGE UP (ref 22–31)
CREAT SERPL-MCNC: 1.18 MG/DL — SIGNIFICANT CHANGE UP (ref 0.5–1.3)
GLUCOSE SERPL-MCNC: 102 MG/DL — HIGH (ref 70–99)
HCT VFR BLD CALC: 39.8 % — SIGNIFICANT CHANGE UP (ref 39–50)
HGB BLD-MCNC: 13.2 G/DL — SIGNIFICANT CHANGE UP (ref 13–17)
MCHC RBC-ENTMCNC: 27.2 PG — SIGNIFICANT CHANGE UP (ref 27–34)
MCHC RBC-ENTMCNC: 33.2 GM/DL — SIGNIFICANT CHANGE UP (ref 32–36)
MCV RBC AUTO: 81.9 FL — SIGNIFICANT CHANGE UP (ref 80–100)
METHOD TYPE: SIGNIFICANT CHANGE UP
METHOD TYPE: SIGNIFICANT CHANGE UP
NRBC # BLD: 0 /100 WBCS — SIGNIFICANT CHANGE UP (ref 0–0)
PLATELET # BLD AUTO: 387 K/UL — SIGNIFICANT CHANGE UP (ref 150–400)
POTASSIUM SERPL-MCNC: 4.3 MMOL/L — SIGNIFICANT CHANGE UP (ref 3.5–5.3)
POTASSIUM SERPL-SCNC: 4.3 MMOL/L — SIGNIFICANT CHANGE UP (ref 3.5–5.3)
RBC # BLD: 4.86 M/UL — SIGNIFICANT CHANGE UP (ref 4.2–5.8)
RBC # FLD: 13.4 % — SIGNIFICANT CHANGE UP (ref 10.3–14.5)
SODIUM SERPL-SCNC: 139 MMOL/L — SIGNIFICANT CHANGE UP (ref 135–145)
WBC # BLD: 8.63 K/UL — SIGNIFICANT CHANGE UP (ref 3.8–10.5)
WBC # FLD AUTO: 8.63 K/UL — SIGNIFICANT CHANGE UP (ref 3.8–10.5)

## 2018-09-08 RX ORDER — NEBIVOLOL HYDROCHLORIDE 5 MG/1
5 TABLET ORAL AT BEDTIME
Qty: 0 | Refills: 0 | Status: DISCONTINUED | OUTPATIENT
Start: 2018-09-08 | End: 2018-09-10

## 2018-09-08 RX ADMIN — Medication 250 MILLIGRAM(S): at 06:10

## 2018-09-08 RX ADMIN — ATORVASTATIN CALCIUM 80 MILLIGRAM(S): 80 TABLET, FILM COATED ORAL at 22:00

## 2018-09-08 RX ADMIN — Medication 1 TABLET(S): at 12:19

## 2018-09-08 RX ADMIN — Medication 650 MILLIGRAM(S): at 06:16

## 2018-09-08 RX ADMIN — Medication 325 MILLIGRAM(S): at 12:19

## 2018-09-08 RX ADMIN — NEBIVOLOL HYDROCHLORIDE 5 MILLIGRAM(S): 5 TABLET ORAL at 22:00

## 2018-09-08 RX ADMIN — Medication 25 MILLIGRAM(S): at 06:07

## 2018-09-08 RX ADMIN — Medication 250 MILLIGRAM(S): at 17:07

## 2018-09-08 RX ADMIN — Medication 650 MILLIGRAM(S): at 06:46

## 2018-09-08 RX ADMIN — CEFTRIAXONE 2000 MILLIGRAM(S): 500 INJECTION, POWDER, FOR SOLUTION INTRAMUSCULAR; INTRAVENOUS at 12:19

## 2018-09-08 NOTE — PROGRESS NOTE ADULT - SUBJECTIVE AND OBJECTIVE BOX
c/c: right knee erythema/swelling/pain      HPI:  72yo/M with PMH HTN, hyperlipidemia presented for evaluation of RT knee swelling and low-grade temp. Patient fell and had torn meniscus for which he had RT knee arthroscopic surgery on 8/26/18. Few days after surgery he noticed that knee was swollen and went to see DR Vázquez in the office, had knee tap and was told cultures were negative. However, he continued to have worsening of swelling and started having low-grade temps with some nausea. He went to see DR Vázquez 9/6 and had another knee tap and was told by DR Vázquez that fluid "did not look good" and he needs to be admitted for knee wash out in OR. Knee was visibly swollen but not actively draining. He was admitted with acute septic arthritis of right knee. Underwent washout in OR 9/6 9/8: pt seen and examined. C/o pain behind right knee. Ambulating. No f/c/r. Tolerating po    Review of system- All 10 systems reviewed and is as per HPI otherwise negative.       Vital Signs Last 24 Hrs  T(C): 36.7 (08 Sep 2018 11:37), Max: 36.9 (07 Sep 2018 17:28)  T(F): 98.1 (08 Sep 2018 11:37), Max: 98.5 (07 Sep 2018 17:28)  HR: 73 (08 Sep 2018 11:37) (73 - 83)  BP: 132/75 (08 Sep 2018 11:37) (132/75 - 150/73)  BP(mean): 89 (08 Sep 2018 11:37) (89 - 89)  RR: 15 (08 Sep 2018 11:37) (15 - 17)  SpO2: 98% (08 Sep 2018 11:37) (97% - 99%)    PHYSICAL EXAM:    GENERAL: Comfortable, no acute distress  HEAD:  Atraumatic, Normocephalic  EYES: EOMI, PERRLA  HEENT: Moist mucous membranes  NECK: Supple, No JVD  NERVOUS SYSTEM:  Alert & Oriented X3, Motor Strength 5/5 B/L upper and lower extremities  CHEST/LUNG: Clear to auscultation bilaterally  HEART: Regular rate and rhythm; No murmurs, rubs, or gallops  ABDOMEN: Soft, Nontender, Nondistended; Bowel sounds present  GENITOURINARY- Voiding, no palpable bladder  EXTREMITIES:  No clubbing, cyanosis, or edema  MUSCULOSKELETAL- Right knee swelling improved. ecchymosis to distal thigh.  SKIN-no rash      LABS:                        13.2   8.63  )-----------( 387      ( 08 Sep 2018 13:15 )             39.8     09-08    139  |  102  |  27<H>  ----------------------------<  102<H>  4.3   |  27  |  1.18    Ca    9.2      08 Sep 2018 13:15        MEDS  acetaminophen   Tablet .. 650 milliGRAM(s) Oral every 6 hours PRN  aluminum hydroxide/magnesium hydroxide/simethicone Suspension 30 milliLiter(s) Oral every 4 hours PRN  aspirin enteric coated 325 milliGRAM(s) Oral daily  atorvastatin 80 milliGRAM(s) Oral at bedtime  cefTRIAXone Injectable.      docusate sodium 100 milliGRAM(s) Oral three times a day  influenza   Vaccine 0.5 milliLiter(s) IntraMuscular once  lactated ringers. 1000 milliLiter(s) IV Continuous <Continuous>  metoprolol tartrate 25 milliGRAM(s) Oral two times a day  morphine  - Injectable 2 milliGRAM(s) SubCutaneous every 3 hours PRN  multivitamin 1 Tablet(s) Oral daily  ondansetron Injectable 4 milliGRAM(s) IV Push every 6 hours PRN  ondansetron Injectable 4 milliGRAM(s) IV Push every 6 hours PRN  oxyCODONE    5 mG/acetaminophen 325 mG 1 Tablet(s) Oral every 4 hours PRN  senna 2 Tablet(s) Oral at bedtime PRN  vancomycin  IVPB 1000 milliGRAM(s) IV Intermittent every 12 hours    ASSESSMENT AND PLAN:  73M, pmh as above a/w:    1. Acute septic arthritis right knee:  -s/p I+D  -f/u outpt and OR cultures-->coag neg staph  -iv abx. per ID    2. HTN:  -continue bb    3. HLD:  -statin    4. Elevated lactate:  -not clinically significant.    5. DVT px

## 2018-09-08 NOTE — PROGRESS NOTE ADULT - ASSESSMENT
73M s/p arthroscopic I&D of right knee    FU Cx  Abx  Analgesia  DVT ppx  Incentive spirometry  WBAT  P/T  Discharge planning

## 2018-09-09 LAB
ANION GAP SERPL CALC-SCNC: 9 MMOL/L — SIGNIFICANT CHANGE UP (ref 5–17)
BUN SERPL-MCNC: 20 MG/DL — SIGNIFICANT CHANGE UP (ref 7–23)
CALCIUM SERPL-MCNC: 9.6 MG/DL — SIGNIFICANT CHANGE UP (ref 8.5–10.1)
CHLORIDE SERPL-SCNC: 103 MMOL/L — SIGNIFICANT CHANGE UP (ref 96–108)
CO2 SERPL-SCNC: 27 MMOL/L — SIGNIFICANT CHANGE UP (ref 22–31)
CREAT SERPL-MCNC: 1.05 MG/DL — SIGNIFICANT CHANGE UP (ref 0.5–1.3)
GLUCOSE SERPL-MCNC: 106 MG/DL — HIGH (ref 70–99)
HCT VFR BLD CALC: 37.2 % — LOW (ref 39–50)
HGB BLD-MCNC: 12.4 G/DL — LOW (ref 13–17)
MCHC RBC-ENTMCNC: 27 PG — SIGNIFICANT CHANGE UP (ref 27–34)
MCHC RBC-ENTMCNC: 33.3 GM/DL — SIGNIFICANT CHANGE UP (ref 32–36)
MCV RBC AUTO: 80.9 FL — SIGNIFICANT CHANGE UP (ref 80–100)
NRBC # BLD: 0 /100 WBCS — SIGNIFICANT CHANGE UP (ref 0–0)
PLATELET # BLD AUTO: 335 K/UL — SIGNIFICANT CHANGE UP (ref 150–400)
POTASSIUM SERPL-MCNC: 4.2 MMOL/L — SIGNIFICANT CHANGE UP (ref 3.5–5.3)
POTASSIUM SERPL-SCNC: 4.2 MMOL/L — SIGNIFICANT CHANGE UP (ref 3.5–5.3)
RBC # BLD: 4.6 M/UL — SIGNIFICANT CHANGE UP (ref 4.2–5.8)
RBC # FLD: 13.3 % — SIGNIFICANT CHANGE UP (ref 10.3–14.5)
SODIUM SERPL-SCNC: 139 MMOL/L — SIGNIFICANT CHANGE UP (ref 135–145)
VANCOMYCIN TROUGH SERPL-MCNC: 11.5 UG/ML — SIGNIFICANT CHANGE UP (ref 10–20)
WBC # BLD: 7.9 K/UL — SIGNIFICANT CHANGE UP (ref 3.8–10.5)
WBC # FLD AUTO: 7.9 K/UL — SIGNIFICANT CHANGE UP (ref 3.8–10.5)

## 2018-09-09 RX ADMIN — CEFTRIAXONE 2000 MILLIGRAM(S): 500 INJECTION, POWDER, FOR SOLUTION INTRAMUSCULAR; INTRAVENOUS at 10:18

## 2018-09-09 RX ADMIN — Medication 250 MILLIGRAM(S): at 17:36

## 2018-09-09 RX ADMIN — NEBIVOLOL HYDROCHLORIDE 5 MILLIGRAM(S): 5 TABLET ORAL at 21:33

## 2018-09-09 RX ADMIN — Medication 325 MILLIGRAM(S): at 12:39

## 2018-09-09 RX ADMIN — Medication 250 MILLIGRAM(S): at 06:26

## 2018-09-09 RX ADMIN — Medication 1 TABLET(S): at 12:38

## 2018-09-09 RX ADMIN — ATORVASTATIN CALCIUM 80 MILLIGRAM(S): 80 TABLET, FILM COATED ORAL at 21:33

## 2018-09-09 NOTE — DISCHARGE NOTE ADULT - HOSPITAL COURSE
73yMale  [facility] with complaint of Right knee pain and swelling. This began about 1 week ago. He is sp Right knee arthroscopy on 8/27/18 with Dr. Vázquez. after surgery he developed increasing pain and swelling. His knee was aspirated in the office for this a couple of times. He had also been having chills at home. Cultures obtained from the office last week pt was told negative He was sent in with cloudy fluid in a lavender tube and swab culture from Dr. Vázquez's office. We spoke with Gurpreet Holley to confirm this. The tube was delivered to the lab here at the hospital for cell count analysis. CC was 94K. No trauma. Denied nausea, vomiting, chest pain, shortness of breath, abdominal pain or fever. No acute motor or sensory changes were reported. He had not been on any antibiotics recently.     Pt received arthroscopic I&D on 9/6. Pt was treated with Vanco and Rocephin in hospital per ID. OR Cx were positive for Coag Neg. Staph. Pt improved clinically and was found to be stable and ready for discharge. 73M, PMH HTN, HLD with complaint of Right knee pain and swelling. This began about 1 week ago. He is sp Right knee arthroscopy on 8/27/18 with Dr. Vázquez. after surgery he developed increasing pain and swelling. His knee was aspirated in the office for this a couple of times. He had also been having chills at home.  Cultures obtained from the office last week pt was told negative. He was sent in with cloudy fluid in a lavender tube and swab culture from Dr. Vázquez's office.  We spoke with Gurpreet Holley to confirm this.  The tube was delivered to the lab here at the hospital for cell count analysis. CC was 94K.  No trauma. Denied nausea, vomiting, chest pain, shortness of breath, abdominal pain or fever.  No acute motor or sensory changes were reported. He had not been on any antibiotics recently.     Pt received arthroscopic I&D on 9/6. Pt was treated with Vanco and Rocephin in hospital per ID. OR Cx were positive for Coag Neg. Staph. Pt improved clinically and was found to be stable and ready for discharge.    HOSPITALIST ADDENDUM:  Agree with above.  Pt will be discharged on iv rocephin X 6 weeks after picc line is placed  please see progress note from 9/10/18 for physical exam/labs.    FINAL DIAGNOSIS:    1. Acute septic arthritis right knee- due to coag neg staph  2. HTN  3. HLD  4. Elevated lactate-not clinically significant    Time taken for dc 65 min  plan d/w patient/wife at bedside.

## 2018-09-09 NOTE — PROGRESS NOTE ADULT - SUBJECTIVE AND OBJECTIVE BOX
Pt reports pain is well controlled. He has been ambulating well with PT assistance. No acute overnight events. Denies fever, CP/SOB, numbness/tingling, calf pain.    Vital Signs Last 24 Hrs  T(C): 36.9 (08 Sep 2018 21:40), Max: 36.9 (08 Sep 2018 17:20)  T(F): 98.5 (08 Sep 2018 21:40), Max: 98.5 (08 Sep 2018 17:20)  HR: 84 (08 Sep 2018 21:40) (73 - 84)  BP: 136/65 (08 Sep 2018 21:40) (132/75 - 157/82)  BP(mean): 89 (08 Sep 2018 11:37) (89 - 89)  RR: 16 (08 Sep 2018 21:40) (15 - 16)  SpO2: 94% (08 Sep 2018 21:40) (94% - 98%)    PE:  Gen: NAD  RLE:  Dressing clean D&I  +sensation L2-S1  +dorsiflexion/plantarflexion of ankle/hallux  +dorsalis pedis pulse  Soft compartments, - calf tenderness

## 2018-09-09 NOTE — DISCHARGE NOTE ADULT - MEDICATION SUMMARY - MEDICATIONS TO TAKE
I will START or STAY ON the medications listed below when I get home from the hospital:    aspirin 81 mg oral tablet  -- 1 tab(s) by mouth once a day  -- Indication: For Home med    Percocet 5/325 oral tablet  -- 1 tab(s) by mouth every 4 to 6 hours, As Needed -for severe pain MDD:6   -- Caution federal law prohibits the transfer of this drug to any person other  than the person for whom it was prescribed.  May cause drowsiness.  Alcohol may intensify this effect.  Use care when operating dangerous machinery.  This prescription cannot be refilled.  This product contains acetaminophen.  Do not use  with any other product containing acetaminophen to prevent possible liver damage.  Using more of this medication than prescribed may cause serious breathing problems.    -- Indication: For prn for pain    rosuvastatin 20 mg oral tablet  -- 1 tab(s) by mouth once a day (at bedtime)  -- Indication: For Home med    Bystolic 5 mg oral tablet  -- 1 tab(s) by mouth once a day  -- Indication: For Home med I will START or STAY ON the medications listed below when I get home from the hospital:    Percocet 5/325 oral tablet  -- 1 tab(s) by mouth every 4 to 6 hours, As Needed -for severe pain MDD:6   -- Caution federal law prohibits the transfer of this drug to any person other  than the person for whom it was prescribed.  May cause drowsiness.  Alcohol may intensify this effect.  Use care when operating dangerous machinery.  This prescription cannot be refilled.  This product contains acetaminophen.  Do not use  with any other product containing acetaminophen to prevent possible liver damage.  Using more of this medication than prescribed may cause serious breathing problems.    -- Indication: For prn for pain    aspirin 81 mg oral tablet  -- 1 tab(s) by mouth once a day  -- Indication: For Home med    rosuvastatin 20 mg oral tablet  -- 1 tab(s) by mouth once a day (at bedtime)  -- Indication: For Home med    Bystolic 5 mg oral tablet  -- 1 tab(s) by mouth once a day  -- Indication: For Home med    cefTRIAXone 2 g/50 mL-iso-osmotic dextrose intravenous solution  -- 2 gram(s) intravenous once a day  -- Indication: For antibiotic    senna oral tablet  -- 2 tab(s) by mouth once a day  -- Indication: For constipation    docusate sodium 100 mg oral capsule  -- 1 cap(s) by mouth 2 times a day  -- Indication: For constipation

## 2018-09-09 NOTE — PROGRESS NOTE ADULT - SUBJECTIVE AND OBJECTIVE BOX
c/c: right knee erythema/swelling/pain      HPI:  72yo/M with PMH HTN, hyperlipidemia presented for evaluation of RT knee swelling and low-grade temp. Patient fell and had torn meniscus for which he had RT knee arthroscopic surgery on 8/26/18. Few days after surgery he noticed that knee was swollen and went to see DR Vázquez in the office, had knee tap and was told cultures were negative. However, he continued to have worsening of swelling and started having low-grade temps with some nausea. He went to see DR Vázquez 9/6 and had another knee tap and was told by DR Vázquez that fluid "did not look good" and he needs to be admitted for knee wash out in OR. Knee was visibly swollen but not actively draining. He was admitted with acute septic arthritis of right knee. Underwent washout in OR 9/6 9/9: pt seen and examined. Doing well. Overall pain/swelling right knee has improved.    Review of system- All 10 systems reviewed and is as per HPI otherwise negative.     Vital Signs Last 24 Hrs  T(C): 36.9 (08 Sep 2018 21:40), Max: 36.9 (08 Sep 2018 17:20)  T(F): 98.5 (08 Sep 2018 21:40), Max: 98.5 (08 Sep 2018 17:20)  HR: 84 (08 Sep 2018 21:40) (73 - 84)  BP: 136/65 (08 Sep 2018 21:40) (132/75 - 157/82)  BP(mean): 89 (08 Sep 2018 11:37) (89 - 89)  RR: 16 (08 Sep 2018 21:40) (15 - 16)  SpO2: 94% (08 Sep 2018 21:40) (94% - 98%)    PHYSICAL EXAM:    GENERAL: Comfortable, no acute distress  HEAD:  Atraumatic, Normocephalic  EYES: EOMI, PERRLA  HEENT: Moist mucous membranes  NECK: Supple, No JVD  NERVOUS SYSTEM:  Alert & Oriented X3, Motor Strength 5/5 B/L upper and lower extremities  CHEST/LUNG: Clear to auscultation bilaterally  HEART: Regular rate and rhythm; No murmurs, rubs, or gallops  ABDOMEN: Soft, Nontender, Nondistended; Bowel sounds present  GENITOURINARY- Voiding, no palpable bladder  EXTREMITIES:  No clubbing, cyanosis, or edema  MUSCULOSKELETAL- Right knee swelling improved. ecchymosis to distal thigh.  SKIN-no rash  LABS:                        12.4   7.90  )-----------( 335      ( 09 Sep 2018 05:34 )             37.2     09-09    139  |  103  |  20  ----------------------------<  106<H>  4.2   |  27  |  1.05    Ca    9.6      09 Sep 2018 05:34        MEDS  acetaminophen   Tablet .. 650 milliGRAM(s) Oral every 6 hours PRN  aluminum hydroxide/magnesium hydroxide/simethicone Suspension 30 milliLiter(s) Oral every 4 hours PRN  aspirin enteric coated 325 milliGRAM(s) Oral daily  atorvastatin 80 milliGRAM(s) Oral at bedtime  cefTRIAXone Injectable.      docusate sodium 100 milliGRAM(s) Oral three times a day  influenza   Vaccine 0.5 milliLiter(s) IntraMuscular once  lactated ringers. 1000 milliLiter(s) IV Continuous <Continuous>  metoprolol tartrate 25 milliGRAM(s) Oral two times a day  morphine  - Injectable 2 milliGRAM(s) SubCutaneous every 3 hours PRN  multivitamin 1 Tablet(s) Oral daily  ondansetron Injectable 4 milliGRAM(s) IV Push every 6 hours PRN  ondansetron Injectable 4 milliGRAM(s) IV Push every 6 hours PRN  oxyCODONE    5 mG/acetaminophen 325 mG 1 Tablet(s) Oral every 4 hours PRN  senna 2 Tablet(s) Oral at bedtime PRN  vancomycin  IVPB 1000 milliGRAM(s) IV Intermittent every 12 hours    ASSESSMENT AND PLAN:  73M, pmh as above a/w:    1. Acute septic arthritis right knee:  -s/p I+D  -f/u outpt and OR cultures-->coag neg staph  -iv abx. per ID  -will likely need midline/picc line and iv abx upon dc.  -pt scheduled to leave for [place] this week-->will d/ w cm in am.     2. HTN:  -continue bb    3. HLD:  -statin    4. Elevated lactate:  -not clinically significant.    5. DVT px

## 2018-09-09 NOTE — DISCHARGE NOTE ADULT - CARE PROVIDER_API CALL
Andrés Vázquez), Orthopaedic Surgery  [address] [place] [address]  Phone: [phone]  Fax: [phone]

## 2018-09-09 NOTE — DISCHARGE NOTE ADULT - PLAN OF CARE
Resolution 1. Continue antibiotics as prescribed.  2. WBAT as tolerated Right lower extremity.  3. Follow up with Dr. Vázquez in 10-14 days. Call office for appointment.  4. Follow up sooner if you develop severe pain in knee or if you develop fevers.

## 2018-09-09 NOTE — DISCHARGE NOTE ADULT - CARE PLAN
Principal Discharge DX:	Septic arthritis of knee, right  Goal:	Resolution  Assessment and plan of treatment:	1. Continue antibiotics as prescribed.  2. WBAT as tolerated Right lower extremity.  3. Follow up with Dr. Vázquez in 10-14 days. Call office for appointment.  4. Follow up sooner if you develop severe pain in knee or if you develop fevers.

## 2018-09-09 NOTE — DISCHARGE NOTE ADULT - PATIENT PORTAL LINK FT
You can access the [facility], offered by [facility], by registering with the following website: [email or web address]

## 2018-09-10 VITALS
TEMPERATURE: 98 F | HEART RATE: 81 BPM | RESPIRATION RATE: 16 BRPM | DIASTOLIC BLOOD PRESSURE: 75 MMHG | SYSTOLIC BLOOD PRESSURE: 136 MMHG | OXYGEN SATURATION: 100 %

## 2018-09-10 LAB
ANION GAP SERPL CALC-SCNC: 9 MMOL/L — SIGNIFICANT CHANGE UP (ref 5–17)
BASOPHILS # BLD AUTO: 0.02 K/UL — SIGNIFICANT CHANGE UP (ref 0–0.2)
BASOPHILS NFR BLD AUTO: 0.2 % — SIGNIFICANT CHANGE UP (ref 0–2)
BUN SERPL-MCNC: 21 MG/DL — SIGNIFICANT CHANGE UP (ref 7–23)
CALCIUM SERPL-MCNC: 9.6 MG/DL — SIGNIFICANT CHANGE UP (ref 8.5–10.1)
CHLORIDE SERPL-SCNC: 103 MMOL/L — SIGNIFICANT CHANGE UP (ref 96–108)
CO2 SERPL-SCNC: 29 MMOL/L — SIGNIFICANT CHANGE UP (ref 22–31)
CREAT SERPL-MCNC: 1.08 MG/DL — SIGNIFICANT CHANGE UP (ref 0.5–1.3)
EOSINOPHIL # BLD AUTO: 0.16 K/UL — SIGNIFICANT CHANGE UP (ref 0–0.5)
EOSINOPHIL NFR BLD AUTO: 1.7 % — SIGNIFICANT CHANGE UP (ref 0–6)
GLUCOSE SERPL-MCNC: 101 MG/DL — HIGH (ref 70–99)
HCT VFR BLD CALC: 41.3 % — SIGNIFICANT CHANGE UP (ref 39–50)
HGB BLD-MCNC: 13.6 G/DL — SIGNIFICANT CHANGE UP (ref 13–17)
IMM GRANULOCYTES NFR BLD AUTO: 0.5 % — SIGNIFICANT CHANGE UP (ref 0–1.5)
LYMPHOCYTES # BLD AUTO: 1.48 K/UL — SIGNIFICANT CHANGE UP (ref 1–3.3)
LYMPHOCYTES # BLD AUTO: 15.9 % — SIGNIFICANT CHANGE UP (ref 13–44)
MCHC RBC-ENTMCNC: 27 PG — SIGNIFICANT CHANGE UP (ref 27–34)
MCHC RBC-ENTMCNC: 32.9 GM/DL — SIGNIFICANT CHANGE UP (ref 32–36)
MCV RBC AUTO: 81.9 FL — SIGNIFICANT CHANGE UP (ref 80–100)
MONOCYTES # BLD AUTO: 1.11 K/UL — HIGH (ref 0–0.9)
MONOCYTES NFR BLD AUTO: 11.9 % — SIGNIFICANT CHANGE UP (ref 2–14)
NEUTROPHILS # BLD AUTO: 6.47 K/UL — SIGNIFICANT CHANGE UP (ref 1.8–7.4)
NEUTROPHILS NFR BLD AUTO: 69.8 % — SIGNIFICANT CHANGE UP (ref 43–77)
NRBC # BLD: 0 /100 WBCS — SIGNIFICANT CHANGE UP (ref 0–0)
PLATELET # BLD AUTO: 457 K/UL — HIGH (ref 150–400)
POTASSIUM SERPL-MCNC: 4.5 MMOL/L — SIGNIFICANT CHANGE UP (ref 3.5–5.3)
POTASSIUM SERPL-SCNC: 4.5 MMOL/L — SIGNIFICANT CHANGE UP (ref 3.5–5.3)
RBC # BLD: 5.04 M/UL — SIGNIFICANT CHANGE UP (ref 4.2–5.8)
RBC # FLD: 13.5 % — SIGNIFICANT CHANGE UP (ref 10.3–14.5)
SODIUM SERPL-SCNC: 141 MMOL/L — SIGNIFICANT CHANGE UP (ref 135–145)
SURGICAL PATHOLOGY FINAL REPORT - CH: SIGNIFICANT CHANGE UP
WBC # BLD: 9.29 K/UL — SIGNIFICANT CHANGE UP (ref 3.8–10.5)
WBC # FLD AUTO: 9.29 K/UL — SIGNIFICANT CHANGE UP (ref 3.8–10.5)

## 2018-09-10 PROCEDURE — 71045 X-RAY EXAM CHEST 1 VIEW: CPT | Mod: 26

## 2018-09-10 RX ORDER — CEFTRIAXONE 500 MG/1
2 INJECTION, POWDER, FOR SOLUTION INTRAMUSCULAR; INTRAVENOUS
Qty: 0 | Refills: 0 | DISCHARGE
Start: 2018-09-10

## 2018-09-10 RX ORDER — DOCUSATE SODIUM 100 MG
1 CAPSULE ORAL
Qty: 0 | Refills: 0 | DISCHARGE
Start: 2018-09-10

## 2018-09-10 RX ORDER — SENNA PLUS 8.6 MG/1
2 TABLET ORAL
Qty: 0 | Refills: 0 | DISCHARGE
Start: 2018-09-10

## 2018-09-10 RX ADMIN — Medication 325 MILLIGRAM(S): at 13:59

## 2018-09-10 RX ADMIN — CEFTRIAXONE 2000 MILLIGRAM(S): 500 INJECTION, POWDER, FOR SOLUTION INTRAMUSCULAR; INTRAVENOUS at 09:47

## 2018-09-10 RX ADMIN — Medication 1 TABLET(S): at 13:58

## 2018-09-10 NOTE — PROGRESS NOTE ADULT - REASON FOR ADMISSION
RT septic knee

## 2018-09-10 NOTE — PROGRESS NOTE ADULT - ASSESSMENT
73M s/p arthroscopic I&D of right knee    FU Cx  Abx per ID. FU PICC  Analgesia  DVT ppx  Incentive spirometry  WBAT  P/T  Discharge planning--likely home today

## 2018-09-10 NOTE — PROGRESS NOTE ADULT - ASSESSMENT
72 yo Male with h/o HTN, hyperlipidemia, left THR was admitted on 9/6 for right knee swelling and low-grade temp. Patient fell and had torn meniscus for which he had RT knee arthroscopic surgery on 8/26/18. Few days after surgery he noticed that knee was swollen and went to see DR Vázquez in the office, had knee tap and was told cultures were negative. However, he continued to have worsening of swelling and started having low-grade temps with some nausea. He went to see Dr Vázquez on 9/6, had another knee tap and was told that fluid "did not look good" and he needs to be admitted for knee wash out in OR. Knee is visibly swollen but not actively draining. He received vancomycin IV and cefazolin.    1. Right knee pain resolving. Right knee septic arthritis s/p washout with CNST. Left knee torn meniscus s/p recent surgery. OA. Allergy to PCN.  -reported with cloudy joint fluid  -cultures showing CNST  -on ceftriaxone 2 gm IV qd # 3  -tolerating abx well so far; no side effects noted  -vancomycin trough level was therapeutic   -called Dr. Vázquez office to ask for cultures results; results reviewed  -monitor closely in negin of PCN allergy history  -continue abx coverage for 6 weeks  -PICC line  -IV abx set up  -monitor temps  -f/u CBC  -supportive care  2. Other issues:   -care per medicine

## 2018-09-10 NOTE — PROGRESS NOTE ADULT - SUBJECTIVE AND OBJECTIVE BOX
Pt reports pain is well controlled. He has been ambulating well with PT assistance. No acute overnight events. Pt feels ready to go home today. Denies fever, CP/SOB, numbness/tingling, calf pain.    Vital Signs Last 24 Hrs  T(C): 36.8 (09 Sep 2018 20:29), Max: 37.1 (09 Sep 2018 11:38)  T(F): 98.2 (09 Sep 2018 20:29), Max: 98.7 (09 Sep 2018 11:38)  HR: 81 (09 Sep 2018 20:29) (74 - 82)  BP: 129/75 (09 Sep 2018 20:29) (129/75 - 151/77)  BP(mean): 92 (09 Sep 2018 11:38) (92 - 92)  RR: 16 (09 Sep 2018 20:29) (16 - 16)  SpO2: 97% (09 Sep 2018 20:29) (97% - 100%)    PE:  Gen: NAD  RLE:  +sensation L2-S1  +dorsiflexion/plantarflexion of ankle/hallux  +dorsalis pedis pulse  NT with ROM of knee  Soft compartments, - calf tenderness

## 2018-09-10 NOTE — PROGRESS NOTE ADULT - SUBJECTIVE AND OBJECTIVE BOX
c/c: right knee erythema/swelling/pain      HPI:  72yo/M with PMH HTN, hyperlipidemia presented for evaluation of RT knee swelling and low-grade temp. Patient fell and had torn meniscus for which he had RT knee arthroscopic surgery on 8/26/18. Few days after surgery he noticed that knee was swollen and went to see DR Vázquez in the office, had knee tap and was told cultures were negative. However, he continued to have worsening of swelling and started having low-grade temps with some nausea. He went to see DR Vázquez 9/6 and had another knee tap and was told by DR Vázquez that fluid "did not look good" and he needs to be admitted for knee wash out in OR. Knee was visibly swollen but not actively draining. He was admitted with acute septic arthritis of right knee. Underwent washout in OR 9/6. Cultures grew out coag neg staph From OR. Blood cx have remained negative.    9/10: pt seen and examined this am. Felt well. Wanted to go home. Was awaiting picc line     Review of system- All 10 systems reviewed and is as per HPI otherwise negative.     Vital Signs Last 24 Hrs  T(C): 36.7 (10 Sep 2018 11:00), Max: 36.9 (09 Sep 2018 17:18)  T(F): 98.1 (10 Sep 2018 11:00), Max: 98.4 (09 Sep 2018 17:18)  HR: 81 (10 Sep 2018 11:00) (74 - 81)  BP: 136/75 (10 Sep 2018 11:00) (129/75 - 151/77)  RR: 16 (10 Sep 2018 11:00) (16 - 16)  SpO2: 100% (10 Sep 2018 11:00) (97% - 100%)  PHYSICAL EXAM:    GENERAL: Comfortable, no acute distress  HEAD:  Atraumatic, Normocephalic  EYES: EOMI, PERRLA  HEENT: Moist mucous membranes  NECK: Supple, No JVD  NERVOUS SYSTEM:  Alert & Oriented X3, Motor Strength 5/5 B/L upper and lower extremities  CHEST/LUNG: Clear to auscultation bilaterally  HEART: Regular rate and rhythm; No murmurs, rubs, or gallops  ABDOMEN: Soft, Nontender, Nondistended; Bowel sounds present  GENITOURINARY- Voiding, no palpable bladder  EXTREMITIES:  No clubbing, cyanosis, or edema  MUSCULOSKELETAL- Right knee swelling improved. ecchymosis to distal thigh.  SKIN-no rash  LABS:                        13.6   9.29  )-----------( 457      ( 10 Sep 2018 06:24 )             41.3     09-10    141  |  103  |  21  ----------------------------<  101<H>  4.5   |  29  |  1.08    Ca    9.6      10 Sep 2018 06:24        MEDS  acetaminophen   Tablet .. 650 milliGRAM(s) Oral every 6 hours PRN  aluminum hydroxide/magnesium hydroxide/simethicone Suspension 30 milliLiter(s) Oral every 4 hours PRN  aspirin enteric coated 325 milliGRAM(s) Oral daily  atorvastatin 80 milliGRAM(s) Oral at bedtime  cefTRIAXone Injectable.      docusate sodium 100 milliGRAM(s) Oral three times a day  influenza   Vaccine 0.5 milliLiter(s) IntraMuscular once  lactated ringers. 1000 milliLiter(s) IV Continuous <Continuous>  metoprolol tartrate 25 milliGRAM(s) Oral two times a day  morphine  - Injectable 2 milliGRAM(s) SubCutaneous every 3 hours PRN  multivitamin 1 Tablet(s) Oral daily  ondansetron Injectable 4 milliGRAM(s) IV Push every 6 hours PRN  ondansetron Injectable 4 milliGRAM(s) IV Push every 6 hours PRN  oxyCODONE    5 mG/acetaminophen 325 mG 1 Tablet(s) Oral every 4 hours PRN  senna 2 Tablet(s) Oral at bedtime PRN  vancomycin  IVPB 1000 milliGRAM(s) IV Intermittent every 12 hours    ASSESSMENT AND PLAN:  73M, pmh as above a/w:    1. Acute septic arthritis right knee:  -s/p I+D  -f/u outpt and OR cultures-->coag neg staph  -d/w ID, PICC line, then dc on iv rocephin X 6 weeks  -pt to f/u with Dr. Vázquez as outpt.    2. HTN:  -continue bb    3. HLD:  -statin    4. Elevated lactate:  -not clinically significant.    5. DVT px

## 2018-09-10 NOTE — PROGRESS NOTE ADULT - SUBJECTIVE AND OBJECTIVE BOX
Date of service: 09-10-18 @ 12:00    Lying in bed in NAD  Right knee pain is improving  No fever or chills    ROS: denies dizziness, no HA, no SOB or cough, no abdominal pain, no diarrhea or constipation; no dysuria, no legs pain, no rashes    MEDICATIONS  (STANDING):  aspirin enteric coated 325 milliGRAM(s) Oral daily  atorvastatin 80 milliGRAM(s) Oral at bedtime  cefTRIAXone Injectable.      cefTRIAXone Injectable. 2000 milliGRAM(s) IV Push every 24 hours  docusate sodium 100 milliGRAM(s) Oral three times a day  lactated ringers. 1000 milliLiter(s) (75 mL/Hr) IV Continuous <Continuous>  multivitamin 1 Tablet(s) Oral daily  nebivolol 5 milliGRAM(s) Oral at bedtime      Vital Signs Last 24 Hrs  T(C): 36.7 (10 Sep 2018 11:00), Max: 36.9 (09 Sep 2018 17:18)  T(F): 98.1 (10 Sep 2018 11:00), Max: 98.4 (09 Sep 2018 17:18)  HR: 81 (10 Sep 2018 11:00) (74 - 81)  BP: 136/75 (10 Sep 2018 11:00) (129/75 - 151/77)  BP(mean): --  RR: 16 (10 Sep 2018 11:00) (16 - 16)  SpO2: 100% (10 Sep 2018 11:00) (97% - 100%)    Physical Exam:      Constitutional: frail looking  HEENT: NC/AT, EOMI, PERRLA, conjunctivae clear  Neck: supple; thyroid not palpable  Back: no tenderness  Respiratory: respiratory effort normal; clear to auscultation  Cardiovascular: S1S2 regular, no murmurs  Abdomen: soft, not tender, not distended, positive BS  Genitourinary: no suprapubic tenderness  Lymphatic: no LN palpable  Musculoskeletal: no muscle tenderness, no joint swelling or tenderness  Extremities: no pedal edema; right knee edema, erythema and reduced ROM - improving  Neurological/ Psychiatric: AxOx3, judgement and insight normal; moving all extremities  Skin: no rashes; no palpable lesions    Labs: reviewed                        13.6   9.29  )-----------( 457      ( 10 Sep 2018 06:24 )             41.3     -10    141  |  103  |  21  ----------------------------<  101<H>  4.5   |  29  |  1.08    Ca    9.6      10 Sep 2018 06:24    Vancomycin Level, Trough: 11.5 ug/mL ( @ 05:34)                          12.2   9.47  )-----------( 301      ( 07 Sep 2018 06:58 )             36.8         141  |  106  |  23  ----------------------------<  157<H>  4.4   |  26  |  1.17    Ca    9.2      07 Sep 2018 06:58    TPro  8.5<H>  /  Alb  2.8<L>  /  TBili  1.1  /  DBili  x   /  AST  44<H>  /  ALT  63  /  AlkPhos  227<H>       LIVER FUNCTIONS - ( 06 Sep 2018 13:11 )  Alb: 2.8 g/dL / Pro: 8.5 gm/dL / ALK PHOS: 227 U/L / ALT: 63 U/L / AST: 44 U/L / GGT: x           Urinalysis Basic - ( 06 Sep 2018 13:11 )    Color: Yellow / Appearance: Clear / S.025 / pH: x  Gluc: x / Ketone: Trace  / Bili: Moderate / Urobili: 12 mg/dL   Blood: x / Protein: 500 mg/dL / Nitrite: Positive   Leuk Esterase: Trace / RBC: 3-5 /HPF / WBC 11-25   Sq Epi: x / Non Sq Epi: Occasional / Bacteria: Moderate      Culture - Fungal, Other (collected 06 Sep 2018 15:39)  Source: .Other Septic  Preliminary Report (07 Sep 2018 06:42):    Testing in progress    Culture - Surgical Swab (collected 06 Sep 2018 15:39)  Source: .Surgical Swab septic right knee  Preliminary Report (07 Sep 2018 17:36):    Few Coag Negative Staphylococcus  Organism: Coag Negative Staphylococcus (08 Sep 2018 21:51)  Organism: Coag Negative Staphylococcus (08 Sep 2018 21:51)      -  Ampicillin/Sulbactam: S <=8/4      -  Cefazolin: S <=4      -  Clindamycin: S <=0.25      -  Erythromycin: S <=0.25      -  Gentamicin: S <=1 Should not be used as monotherapy      -  Oxacillin: S <=0.25      -  Penicillin: R 2      -  RIF- Rifampin: S <=1 Should not be used as monotherapy      -  Tetra/Doxy: S <=1      -  Trimethoprim/Sulfamethoxazole: S <=0.5/9.5      -  Vancomycin: S 2      Method Type: REHANA    Culture - Blood (collected 06 Sep 2018 13:11)  Source: .Blood None  Preliminary Report (07 Sep 2018 18:02):    No growth to date.    Culture - Blood (collected 06 Sep 2018 13:11)  Source: .Blood None  Preliminary Report (07 Sep 2018 18:02):    No growth to date.    Culture - Body Fluid with Gram Stain (collected 06 Sep 2018 12:00)  Source: .Body Fluid Knee Fluid  Gram Stain (06 Sep 2018 22:34):    Moderate polymorphonuclear leukocytes per low power field    No organisms seen per oil power field  Preliminary Report (07 Sep 2018 17:38):    Few Coag Negative Staphylococcus  Organism: Coag Negative Staphylococcus (08 Sep 2018 19:34)  Organism: Coag Negative Staphylococcus (08 Sep 2018 19:34)      -  Ampicillin/Sulbactam: S <=8/4      -  Cefazolin: S <=4      -  Clindamycin: S <=0.25      -  Erythromycin: S <=0.25      -  Gentamicin: S <=1 Should not be used as monotherapy      -  Oxacillin: S <=0.25      -  Penicillin: R 0.25      -  RIF- Rifampin: S <=1 Should not be used as monotherapy      -  Tetra/Doxy: S <=1      -  Trimethoprim/Sulfamethoxazole: S <=0.5/9.5      -  Vancomycin: S 2      Method Type: REHANA        Radiology: all available radiological tests reviewed    Advanced directives addressed: full resuscitation

## 2018-09-10 NOTE — ADVANCED PRACTICE NURSE CONSULT - ASSESSMENT
Rec.d order to place PICC line for long-term IV abx. Reviewed chart, labwork, explained all risks and benefits and obtained consent for PICC placement. Pt. A&Ox3 and verified pt.’s identification, name, , and correct procedure. Inserted Navilyst PICC 4FS w/PASV technology via ultrasound guidance to ­right basilic, number of insertion attempts: 1, cut to 41 cm. length, brisk blood return, flushes well w/20 ml. NS. Site prepped with CHG, Draped in sterile fashion using strict aseptic technique maintained throughout procedure, performed hand hygiene, all team members maintained full barrier precautions, 1% lidocaine used subdermally, Minimal blood loss. Site covered with sterile dressing and dated. No complications. Endcap placed. Pt. tolerated well. All sharps and guidewires accounted for. CXR confirms placement, no pneumothorax. Report given to district nurse. Lot #[id number].

## 2018-09-11 LAB
CULTURE RESULTS: SIGNIFICANT CHANGE UP
ORGANISM # SPEC MICROSCOPIC CNT: SIGNIFICANT CHANGE UP
SPECIMEN SOURCE: SIGNIFICANT CHANGE UP

## 2018-09-13 DIAGNOSIS — E78.5 HYPERLIPIDEMIA, UNSPECIFIED: ICD-10-CM

## 2018-09-13 DIAGNOSIS — M25.461 EFFUSION, RIGHT KNEE: ICD-10-CM

## 2018-09-13 DIAGNOSIS — T81.4XXA INFECTION FOLLOWING A PROCEDURE, INITIAL ENCOUNTER: ICD-10-CM
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
## 2018-09-13 DIAGNOSIS — M00.061 STAPHYLOCOCCAL ARTHRITIS, RIGHT KNEE: ICD-10-CM

## 2018-09-13 DIAGNOSIS — B95.7 OTHER STAPHYLOCOCCUS AS THE CAUSE OF DISEASES CLASSIFIED ELSEWHERE: ICD-10-CM

## 2018-09-13 DIAGNOSIS — Z79.899 OTHER LONG TERM (CURRENT) DRUG THERAPY: ICD-10-CM

## 2018-09-13 DIAGNOSIS — Z79.82 LONG TERM (CURRENT) USE OF ASPIRIN: ICD-10-CM

## 2018-09-13 DIAGNOSIS — I10 ESSENTIAL (PRIMARY) HYPERTENSION: ICD-10-CM

## 2018-09-13 DIAGNOSIS — Z88.0 ALLERGY STATUS TO PENICILLIN: ICD-10-CM

## 2018-10-06 LAB
CULTURE RESULTS: SIGNIFICANT CHANGE UP
SPECIMEN SOURCE: SIGNIFICANT CHANGE UP

## 2019-04-25 NOTE — H&P ADULT - HISTORY OF PRESENT ILLNESS
72yo/M with PMH HTN, hyperlipidemia presented for evaluation of RT knee swelling and low-grade temp. Patient fell and had torn meniscus for which he had RT knee arthroscopic surgery on 8/26/18. Few days after surgery he noticed that knee was swollen and went to see DR Vázquez in the office, had knee tap and was told cultures were negative. However, he continued to have worsening of swelling and started having low-grade temps with some nausea. He went to see DR Vázquez today and had another knee tap and was told by DR Vázquez that fluid "did not look good" and he needs to be admitted for knee wash out in OR. Knee is visibly swollen but not actively draining. Patient denies any cardiac history, he is independant ambulator, denies chest pain or dyspnea on exertion or rest. He is physically active and play sports
Yes...

## 2019-05-20 PROBLEM — I10 ESSENTIAL (PRIMARY) HYPERTENSION: Chronic | Status: ACTIVE | Noted: 2018-09-06

## 2019-05-20 PROBLEM — Z00.00 ENCOUNTER FOR PREVENTIVE HEALTH EXAMINATION: Status: ACTIVE | Noted: 2019-05-20

## 2019-05-20 PROBLEM — E78.5 HYPERLIPIDEMIA, UNSPECIFIED: Chronic | Status: ACTIVE | Noted: 2018-09-06

## 2019-05-23 ENCOUNTER — APPOINTMENT (OUTPATIENT)
Dept: ORTHOPEDIC SURGERY | Facility: CLINIC | Age: 74
End: 2019-05-23
Payer: MEDICARE

## 2019-05-23 ENCOUNTER — TRANSCRIPTION ENCOUNTER (OUTPATIENT)
Age: 74
End: 2019-05-23

## 2019-05-23 VITALS
BODY MASS INDEX: 28.34 KG/M2 | HEART RATE: 61 BPM | WEIGHT: 166 LBS | DIASTOLIC BLOOD PRESSURE: 92 MMHG | SYSTOLIC BLOOD PRESSURE: 179 MMHG | HEIGHT: 64 IN

## 2019-05-23 DIAGNOSIS — Z78.9 OTHER SPECIFIED HEALTH STATUS: ICD-10-CM

## 2019-05-23 DIAGNOSIS — M54.9 DORSALGIA, UNSPECIFIED: ICD-10-CM

## 2019-05-23 DIAGNOSIS — M25.569 PAIN IN UNSPECIFIED KNEE: ICD-10-CM

## 2019-05-23 PROCEDURE — 73562 X-RAY EXAM OF KNEE 3: CPT | Mod: 50

## 2019-05-23 PROCEDURE — 99203 OFFICE O/P NEW LOW 30 MIN: CPT

## 2019-05-23 RX ORDER — NEBIVOLOL HYDROCHLORIDE 10 MG/1
10 TABLET ORAL
Refills: 0 | Status: ACTIVE | COMMUNITY

## 2019-05-23 RX ORDER — ROSUVASTATIN CALCIUM 20 MG/1
20 TABLET, FILM COATED ORAL
Refills: 0 | Status: ACTIVE | COMMUNITY

## 2019-05-23 RX ORDER — OMEPRAZOLE 20 MG/1
20 CAPSULE, DELAYED RELEASE ORAL
Refills: 0 | Status: ACTIVE | COMMUNITY

## 2019-05-23 RX ORDER — MELOXICAM 15 MG/1
15 TABLET ORAL
Refills: 0 | Status: ACTIVE | COMMUNITY

## 2019-05-23 NOTE — PHYSICAL EXAM
[UE/LE] : Sensory: Intact in bilateral upper & lower extremities [ALL] : dorsalis pedis, posterior tibial, femoral, popliteal, and radial 2+ and symmetric bilaterally [Normal] : Oriented to person, place, and time, insight and judgement were intact and the affect was normal [Poor Appearance] : well-appearing [Acute Distress] : not in acute distress [de-identified] : \par FROM to hip\par \par Skin Warm, Dry, no erythema, no lesions \par \par Knee \par stable\par anatomic alignment\par ROM 0-80 - right 0-130 left\par Valgus/Varus Stress intact \par Effusion - mild to right \par Crepitus - positive bilaterally\par Patella Grind - Negative \par Patella Apprehension - Negative \par Medial joint line tenderness - Negative\par Lateral Joint line tenderness - Negative\par Danyell Test - Negative  \par Lachman test - Negative \par Anterior Drawer Test -  Negative \par \par Distal Motor Function intact \par Sensation intact to light touch bilaterally \par Pulses 2+ bilaterally\par  [de-identified] : 3 view bilateral knee medial knee bone on bone OA to right, mild to left

## 2019-05-23 NOTE — DISCUSSION/SUMMARY
[de-identified] : Discussion had with patient, advised patient he will need possibly up to 1 year prior to getting TKA due to septic arthritis. advised will need aspiration and labs prior \par Patient will follow up with Dr. Andreson (Has appt for June 28)\par Patient aware must follow up with MD for further eval and treatment \par Patients questions were answered and patient is satisfied with today's visit\par

## 2019-05-23 NOTE — REVIEW OF SYSTEMS
[Joint Pain] : joint pain [Joint Stiffness] : joint stiffness [Feeling Tired] : fatigue [Muscle Weakness] : muscle weakness [Negative] : Heme/Lymph [FreeTextEntry9] : bilateral knee

## 2019-05-23 NOTE — HISTORY OF PRESENT ILLNESS
[de-identified] : Patient is a 73-year-old male who presents complaining of bilateral knee pain. Patient states most of the symptoms at the right knee mild symptoms the left. Patient states in September he had an arthroscopy done at [facility] due to meniscal tear which led to a septic knee that same week. Patient had arthroscopic washout done. Patient had PICC line placed and given ABX for 6 weeks.  Patient states since then he's been through physical therapy with no relief. Patient states pain is a diffuse ache to the knee mild swelling. Patient denies any fevers or chills no warmth or redness to the knee. Patient states he does take Aleve with relief. Patient states prior to the arthroscopy he was told he may need a total knee replacement.  patient states after Scope he had series of 3 gel injections with no relief

## 2019-06-20 ENCOUNTER — OTHER (OUTPATIENT)
Age: 74
End: 2019-06-20

## 2019-06-20 ENCOUNTER — RX RENEWAL (OUTPATIENT)
Age: 74
End: 2019-06-20

## 2019-06-28 ENCOUNTER — APPOINTMENT (OUTPATIENT)
Dept: ORTHOPEDIC SURGERY | Facility: CLINIC | Age: 74
End: 2019-06-28
Payer: MEDICARE

## 2019-06-28 VITALS
SYSTOLIC BLOOD PRESSURE: 176 MMHG | BODY MASS INDEX: 28.34 KG/M2 | WEIGHT: 166 LBS | DIASTOLIC BLOOD PRESSURE: 89 MMHG | HEART RATE: 66 BPM | HEIGHT: 64 IN

## 2019-06-28 PROCEDURE — 99215 OFFICE O/P EST HI 40 MIN: CPT

## 2019-06-28 PROCEDURE — 73564 X-RAY EXAM KNEE 4 OR MORE: CPT | Mod: 50

## 2019-06-28 NOTE — PHYSICAL EXAM
[de-identified] : The patient appears well nourished  and in no apparent distress.  The patient is alert and oriented to person, place, and time.   Affect and mood appear normal. The head is normocephalic and atraumatic.  The eyes reveal normal sclera and extra ocular muscles are intact. The tongue is midline with no apparent lesions.  Skin shows normal turgor with no evidence of eczema or psoriasis.  No respiratory distress noted.  Sensation grossly intact.\par   [de-identified] : Exam of the right knee shows a small effusion, -5 to 117 degrees, no warmth, no erythema, 5 degrees of varus which is fully correctable. 5/5 motor strength bilaterally distally. Sensation intact distally.  [de-identified] : Xray- 4 views of the bilateral knees shows bone on bone arthritis arthritis of the medial compartment of the right knee and severe arthritis of the medial compartment of the left knee with an osteochondral defect C.

## 2019-06-28 NOTE — DISCUSSION/SUMMARY
[de-identified] : The patient is a 73 year old male with bone on bone arthritis arthritis of the medial compartment of the right knee and severe arthritis of the medial compartment of the left knee. Clinically there is no evidence of infection of the right knee at this time however due to his history I am going to send him for ESR and CRP levels.  At this point he has exhausted conservative treatment and I think would be a good candidate for knee replacement although we discussed his increased risk of prosthetic joint infection given his history. We are planning for surgery in November.He will return in 2 months before surgery for aspiration of the knee.  At this point his left knee pain is tolerable and he is focused on dealing with the right side.\par \par A discussion was had with the patient regarding a right total knee replacement. A long discussion was had with the patient as what the total joint replacement would entail. A model was used to demonstrate the operation and to discuss bearing surfaces of the implants. The hospitalization and rehabilitation were discussed.  The use of perioperative antibiotics and DVT prophylaxis were discussed. The risks, benefits and alternatives to surgical intervention were discussed at length with the patient. Specific risks discussed included: infection, wound breakdown, numbness and damage to nerves, tendon, muscle, arteries or other blood vessels. The possibility of recurrent pain, no improvement in pain and actual worsening of the pain were also mentioned in conversation with the patient. Medical complications related to the patient's general medical health including deep vein thrombosis, pulmonary embolus, heart attack, stroke, death and other complications from anesthesia were discussed as well. The patient was told that we will take steps to minimize these risks by using sterile technique, antibiotics and DVT prophylaxis when appropriate and following the patient postoperatively in the clinic setting to monitor progress. The benefits of surgery were discussed with the patient including the potential to improve the current clinical condition through operative intervention. Alternatives to surgical intervention include continued conservative management which may yield less than optimal results in this particular patient. All questions were answered to the satisfaction of the patient.

## 2019-06-28 NOTE — ADDENDUM
[FreeTextEntry1] : This note was authored by Jaime Singer working as a medical scribe for Dr. Qamar Anderson. The note was reviewed, edited, and revised by Dr. Qamar Anderson whom is in agreement with the exam findings, imaging findings, and treatment plan. 06/28/2019.

## 2019-07-12 ENCOUNTER — RESULT REVIEW (OUTPATIENT)
Age: 74
End: 2019-07-12

## 2019-07-26 ENCOUNTER — RESULT REVIEW (OUTPATIENT)
Age: 74
End: 2019-07-26

## 2019-07-26 LAB
CRP SERPL-MCNC: 0.12 MG/DL
ERYTHROCYTE [SEDIMENTATION RATE] IN BLOOD BY WESTERGREN METHOD: 2 MM/HR

## 2019-10-21 ENCOUNTER — OTHER (OUTPATIENT)
Age: 74
End: 2019-10-21

## 2019-10-31 ENCOUNTER — APPOINTMENT (OUTPATIENT)
Dept: ORTHOPEDIC SURGERY | Facility: CLINIC | Age: 74
End: 2019-10-31
Payer: MEDICARE

## 2019-10-31 VITALS
DIASTOLIC BLOOD PRESSURE: 92 MMHG | SYSTOLIC BLOOD PRESSURE: 164 MMHG | BODY MASS INDEX: 28.34 KG/M2 | WEIGHT: 166 LBS | HEART RATE: 69 BPM | HEIGHT: 64 IN

## 2019-10-31 DIAGNOSIS — M17.0 BILATERAL PRIMARY OSTEOARTHRITIS OF KNEE: ICD-10-CM

## 2019-10-31 PROCEDURE — 20610 DRAIN/INJ JOINT/BURSA W/O US: CPT

## 2019-10-31 PROCEDURE — 99213 OFFICE O/P EST LOW 20 MIN: CPT | Mod: 25

## 2019-10-31 NOTE — ADDENDUM
[FreeTextEntry1] : This note was authored by Jaime Singer working as a medical scribe for Dr. Qamar Anderson. The note was reviewed, edited, and revised by Dr. Qamar Anderson whom is in agreement with the exam findings, imaging findings, and treatment plan. 10/31/2019.

## 2019-10-31 NOTE — PHYSICAL EXAM
[de-identified] : The patient appears well nourished  and in no apparent distress.  The patient is alert and oriented to person, place, and time.   Affect and mood appear normal. The head is normocephalic and atraumatic.  The eyes reveal normal sclera and extra ocular muscles are intact. The tongue is midline with no apparent lesions.  Skin shows normal turgor with no evidence of eczema or psoriasis.  No respiratory distress noted.  Sensation grossly intact.\par   [de-identified] : Exam of the right knee shows a small effusion, -5 to 117 degrees, no warmth, no erythema, 5 degrees of varus which is fully correctable. 5/5 motor strength bilaterally distally. Sensation intact distally.

## 2019-10-31 NOTE — PROCEDURE
[de-identified] : The right knee was aspirated using a 20 gauge needle from a superolateral approach using sterile technique. 10 CC's of yellow clear normal appearing serous fluid was obtained and a band-aide dressing was applied

## 2019-10-31 NOTE — DISCUSSION/SUMMARY
[de-identified] : The patient is a 73 year old male with bone on bone arthritis arthritis of the right knee. The right knee was aspirated today. Fluid was sent for cell count and cultures.  The fluid did not have any indication of infection.  I expect the cell count and cultures to reflect this as well and we should be able to move forward with knee replacement for him as scheduled on December 2.

## 2019-10-31 NOTE — HISTORY OF PRESENT ILLNESS
[de-identified] : The patient is a 74 year old male being seen for evaluation of his right knee. He has advanced osteoarthritis of the right knee. He is scheduled for surgery for December 2nd. Last seen in the office in June of this year at which time he was sent for ESR and CRP levels. CRP was 0.12 and ESR was 2 from July 6th. He is ambulating and transferring with pain and stiffness. He reports continued pain in the medial aspect of the right knee. He reports instances of locking and giving way of the right knee.  Given his history of infection he represents today for aspiration of the knee prior to surgery.

## 2019-11-01 ENCOUNTER — RESULT REVIEW (OUTPATIENT)
Age: 74
End: 2019-11-01

## 2019-11-01 LAB
B PERT IGG+IGM PNL SER: CLEAR
COLOR FLD: YELLOW
EOSINOPHIL # FLD MANUAL: 0 %
FLUID INTAKE SUBSTANCE CLASS: NORMAL
LYMPHOCYTES # FLD MANUAL: 4 %
MESOTHL CELL NFR FLD: 5 %
MONOS+MACROS NFR FLD MANUAL: 86 %
NEUTS SEG # FLD MANUAL: 5 %
NRBC # FLD: 0
RBC # FLD MANUAL: 1000 /UL
TOTAL CELLS COUNTED FLD: 110 /UL
TUBE TYPE: NORMAL
UNIDENT CELLS NFR FLD MANUAL: 0 %
VARIANT LYMPHS # FLD MANUAL: 0 %

## 2019-11-11 ENCOUNTER — OUTPATIENT (OUTPATIENT)
Dept: OUTPATIENT SERVICES | Facility: HOSPITAL | Age: 74
LOS: 1 days | End: 2019-11-11
Payer: MEDICARE

## 2019-11-11 VITALS
RESPIRATION RATE: 20 BRPM | HEIGHT: 65 IN | SYSTOLIC BLOOD PRESSURE: 172 MMHG | DIASTOLIC BLOOD PRESSURE: 90 MMHG | TEMPERATURE: 97 F | WEIGHT: 166.23 LBS | HEART RATE: 67 BPM

## 2019-11-11 DIAGNOSIS — Z98.890 OTHER SPECIFIED POSTPROCEDURAL STATES: Chronic | ICD-10-CM

## 2019-11-11 DIAGNOSIS — Z29.9 ENCOUNTER FOR PROPHYLACTIC MEASURES, UNSPECIFIED: ICD-10-CM

## 2019-11-11 DIAGNOSIS — I10 ESSENTIAL (PRIMARY) HYPERTENSION: ICD-10-CM

## 2019-11-11 DIAGNOSIS — E78.5 HYPERLIPIDEMIA, UNSPECIFIED: ICD-10-CM

## 2019-11-11 DIAGNOSIS — Z13.89 ENCOUNTER FOR SCREENING FOR OTHER DISORDER: ICD-10-CM

## 2019-11-11 DIAGNOSIS — Z01.818 ENCOUNTER FOR OTHER PREPROCEDURAL EXAMINATION: ICD-10-CM

## 2019-11-11 DIAGNOSIS — M17.11 UNILATERAL PRIMARY OSTEOARTHRITIS, RIGHT KNEE: ICD-10-CM

## 2019-11-11 DIAGNOSIS — Z96.642 PRESENCE OF LEFT ARTIFICIAL HIP JOINT: Chronic | ICD-10-CM

## 2019-11-11 LAB
MRSA PCR RESULT.: SIGNIFICANT CHANGE UP
S AUREUS DNA NOSE QL NAA+PROBE: SIGNIFICANT CHANGE UP

## 2019-11-11 PROCEDURE — 86850 RBC ANTIBODY SCREEN: CPT

## 2019-11-11 PROCEDURE — 86901 BLOOD TYPING SEROLOGIC RH(D): CPT

## 2019-11-11 PROCEDURE — 85730 THROMBOPLASTIN TIME PARTIAL: CPT

## 2019-11-11 PROCEDURE — 80048 BASIC METABOLIC PNL TOTAL CA: CPT

## 2019-11-11 PROCEDURE — 87641 MR-STAPH DNA AMP PROBE: CPT

## 2019-11-11 PROCEDURE — 87640 STAPH A DNA AMP PROBE: CPT

## 2019-11-11 PROCEDURE — 93005 ELECTROCARDIOGRAM TRACING: CPT

## 2019-11-11 PROCEDURE — 86900 BLOOD TYPING SEROLOGIC ABO: CPT

## 2019-11-11 PROCEDURE — 36415 COLL VENOUS BLD VENIPUNCTURE: CPT

## 2019-11-11 PROCEDURE — 93010 ELECTROCARDIOGRAM REPORT: CPT

## 2019-11-11 PROCEDURE — 85027 COMPLETE CBC AUTOMATED: CPT

## 2019-11-11 PROCEDURE — 83036 HEMOGLOBIN GLYCOSYLATED A1C: CPT

## 2019-11-11 PROCEDURE — G0463: CPT

## 2019-11-11 PROCEDURE — 85610 PROTHROMBIN TIME: CPT

## 2019-11-11 RX ORDER — ASPIRIN/CALCIUM CARB/MAGNESIUM 324 MG
1 TABLET ORAL
Qty: 0 | Refills: 0 | DISCHARGE

## 2019-11-11 RX ORDER — UBIDECARENONE 100 MG
300 CAPSULE ORAL
Qty: 0 | Refills: 0 | DISCHARGE

## 2019-11-11 RX ORDER — MELOXICAM 15 MG/1
0 TABLET ORAL
Qty: 0 | Refills: 0 | DISCHARGE

## 2019-11-11 RX ORDER — ROSUVASTATIN CALCIUM 5 MG/1
1 TABLET ORAL
Qty: 0 | Refills: 0 | DISCHARGE

## 2019-11-11 RX ORDER — NEBIVOLOL HYDROCHLORIDE 5 MG/1
1 TABLET ORAL
Qty: 0 | Refills: 0 | DISCHARGE

## 2019-11-11 RX ORDER — RAMIPRIL 5 MG
0 CAPSULE ORAL
Qty: 0 | Refills: 0 | DISCHARGE

## 2019-11-11 RX ORDER — OMEPRAZOLE 10 MG/1
1 CAPSULE, DELAYED RELEASE ORAL
Qty: 0 | Refills: 0 | DISCHARGE

## 2019-11-11 NOTE — H&P PST ADULT - ASSESSMENT

## 2019-11-11 NOTE — H&P PST ADULT - NSICDXFAMILYHX_GEN_ALL_CORE_FT
FAMILY HISTORY:  Mother  Still living? No  Family history of acute myocardial infarction, Age at diagnosis: Age Unknown

## 2019-11-11 NOTE — H&P PST ADULT - NSICDXPASTMEDICALHX_GEN_ALL_CORE_FT
PAST MEDICAL HISTORY:  HLD (hyperlipidemia)     HTN (hypertension)     Risk factors for obstructive sleep apnea

## 2019-11-11 NOTE — H&P PST ADULT - NSICDXPROBLEM_GEN_ALL_CORE_FT
PROBLEM DIAGNOSES  Problem: Primary osteoarthritis of right knee  Assessment and Plan: Right Total Knee Replacement  Medical Clearance    Problem: Need for prophylactic measure  Assessment and Plan:     Problem: Screening for substance abuse  Assessment and Plan: PROBLEM DIAGNOSES  Problem: HLD (hyperlipidemia)  Assessment and Plan: Follows with PCP    Problem: Primary osteoarthritis of right knee  Assessment and Plan: Right Total Knee Replacement  Medical Clearance    Problem: HTN (hypertension)  Assessment and Plan: Follows with PCP    Problem: Need for prophylactic measure  Assessment and Plan: High risk. Surgical team to evaluate need for pharmacologic VTE prophylaxis    Problem: Screening for substance abuse  Assessment and Plan: Opioid screening tool score =0.  Low risk for potential abuse

## 2019-11-11 NOTE — H&P PST ADULT - HISTORY OF PRESENT ILLNESS
0745 Bedside report received from KISHORE Panda RN using SBAR, MAR and recent events. 0930 Hourly rounding, pt voices no needs. 1105 Rounding, pt states \"felt little dizzy when up to shower\". Vs's stable, fundus firm, small rubra. Pt encouraged to eat and drink more fluids, rest.    1300 Pt resting in bed, voices no needs    1430 Visitors at bedside    1600 Rounding, scheduled meds given. 18 Visiting family    46 Verbal report given to RAIZA Johnson RN using SBAR, MAR and recent events. This is a 74 y.o male who presents to PST today. This is a 74 y.o male who presents to PST today.  The pt reports last year he sustained a right torn meniscus.  He was seen by Ortho and had surgical repair of same.  The pt states the pain has never subsided and he was recently evaluated by Dr. Anderson and additional x-rays were taken.  He is now in anticipation of a right knee replacement.

## 2019-11-11 NOTE — PATIENT PROFILE ADULT - NSPROEDALEARNPREF_GEN_A_NUR
individual instruction/verbal instruction/written material/Pre op teaching surgical scrub pain management instructions given to pt by NP

## 2019-11-11 NOTE — H&P PST ADULT - NSICDXPASTSURGICALHX_GEN_ALL_CORE_FT
PAST SURGICAL HISTORY:  History of total hip replacement, left     S/P shoulder surgery Left    Status post medial meniscus repair Bilateral

## 2019-11-12 ENCOUNTER — TRANSCRIPTION ENCOUNTER (OUTPATIENT)
Age: 74
End: 2019-11-12

## 2019-11-15 ENCOUNTER — RESULT REVIEW (OUTPATIENT)
Age: 74
End: 2019-11-15

## 2019-11-15 LAB — BACTERIA FLD CULT: NORMAL

## 2019-12-04 ENCOUNTER — APPOINTMENT (OUTPATIENT)
Dept: ORTHOPEDIC SURGERY | Facility: HOSPITAL | Age: 74
End: 2019-12-04

## 2019-12-19 ENCOUNTER — APPOINTMENT (OUTPATIENT)
Dept: ORTHOPEDIC SURGERY | Facility: CLINIC | Age: 74
End: 2019-12-19

## 2020-01-13 ENCOUNTER — APPOINTMENT (OUTPATIENT)
Dept: ORTHOPEDIC SURGERY | Facility: CLINIC | Age: 75
End: 2020-01-13

## 2020-03-01 ENCOUNTER — FORM ENCOUNTER (OUTPATIENT)
Age: 75
End: 2020-03-01

## 2020-06-08 NOTE — H&P ADULT - GASTROINTESTINAL
Soft, non-tender, no hepatosplenomegaly, normal bowel sounds Patient education: Common wrist injuries (The Basics)  View in Vietnamese  Written by the doctors and editors at [facility]  What are common types of wrist injuries?  Common types of wrist injuries include:    ?Sprains – A wrist sprain is when a ligament in the wrist tears or gets stretched too much. Ligaments are tough bands of tissue that connect bones to other bones.    ?Fractures – A "fracture" is another word for a broken bone. There are different kinds of wrist fractures. Some involve a break in one of the small bones in the wrist. Others involve a break in one of the forearm bones near the wrist (figure 1). A common type of wrist fracture involves the end of one of the forearm bones. It can happen when a person falls onto his or her outstretched hand.    What are the symptoms of a wrist injury?  Wrist injuries can cause:    ?Pain    ?Swelling    ?Bruising    ?Stiffness    ?Weakness in the wrist or arm    ?The wrist or arm to look bent    Sprains usually cause less pain and swelling than fractures, but not always.    Will I need tests?  Probably. Your doctor or nurse will ask about your symptoms and do an exam. He or she will also ask about how you got hurt, like whether you fell and how you landed. To check for a fracture, the doctor can do an X-ray. He or she might also do an imaging test such as a CT or MRI scan. Imaging tests create pictures of the inside of the body.    How are wrist injuries treated?  Treatment depends on the type of injury you have and how severe it is.    If you have a lot of pain or a severe injury, your doctor will prescribe a strong pain medicine. If your injury is mild, your doctor might recommend that you take an over-the-counter medicine for your pain. Over-the-counter medicines include acetaminophen (sample brand name: Tylenol), ibuprofen (sample brand names: Advil, Motrin), and naproxen (sample brand name: Aleve).    Treatment for a wrist sprain can include:    ?Resting your wrist    ?Putting ice on your wrist – To reduce swelling, you can put a cold gel pack, bag of ice, or bag of frozen vegetables on the injured area every 1 to 2 hours, for 15 minutes each time. You should put a thin towel between the ice (or other cold object) and your skin. You should put the ice (or other cold object) on your wrist for a few hours after your injury. For very painful injuries, some people find it helpful to ice longer, even up to 2 days after their injury.    ?Wearing an elastic bandage (such as an ACE wrap) or splint    ?Surgery – Most people with a wrist sprain do not need surgery. But people who tore a ligament might need surgery to fix the ligament.    A wrist fracture is usually treated with a splint or cast. If you need a cast, your doctor might wait for your swelling to get better before he or she puts the cast on your wrist. This might take a few days. While you wait for the swelling to get better, your doctor will probably have you wear a splint.    Before your doctor puts the cast on your wrist, he or she will make sure that your bone is in the correct position. If your bone is not in the correct position, your doctor might do a procedure or surgery to put your bone in the correct position.    After your cast or splint comes off, your doctor might recommend that you see a physical therapist (exercise expert). The physical therapist can show you exercises and stretches to make your muscles stronger and help your joints move more easily.    How long do wrist injuries take to heal?  It depends partly on the type of injury and how severe it is. A mild sprain takes about 1 to 2 weeks to heal. Fractures can take weeks to months to heal.    It also depends on the person. Healthy children usually heal very quickly. Older adults or adults with other medical problems can take much longer to heal.    Can I do anything to improve the healing process?  Yes. It's important to follow all of your doctor's instructions while your sprain or fracture is healing. He or she will probably recommend that you eat a healthy diet that includes getting enough calcium, vitamin D, and protein (figure 2). He or she will also probably recommend that you avoid doing certain things. For example, he or she might recommend that you:    ?Avoid doing certain physical activities.    ?Avoid smoking – A fracture can take longer to heal if you smoke.    ?Avoid damaging your cast or getting it wet, if you have a cast that shouldn't get wet.    When should I call my doctor or nurse?  Your doctor or nurse will tell you when to call him or her. In general, you should call your doctor or nurse if:    ?You have severe pain, or your pain or swelling gets worse.    ?You have numbness or tingling in your fingers, or your fingers look blue or purple.    ?You damage your cast or get it wet, and it's not supposed to get wet.    More on this topic  Patient education: Cast and splint care (The Basics)  View in Vietnamese  Written by the doctors and editors at [facility]  Why do I have a cast or splint?  Your doctor gave you a cast or splint to treat your broken bone. (A broken bone is also called a "fracture.") The cast or splint will reduce your pain and protect your bone as it heals.    Why is it important to take care of a cast or splint?  It's important to take care of a cast or splint so that the skin under the cast doesn't get hurt or infected.    Can I get my cast wet?  It depends on what kind of cast you have. Your doctor will tell you if you have a waterproof cast that can get wet. Otherwise, you should not get your cast wet.    To keep your cast dry when you bathe, cover it with two plastic bags, and tape each bag – separately – to your skin with duct tape (picture 1). Then keep your cast outside the tub or shower when you wash your body. In young children, use a rubber band at the top of each plastic bag instead of tape. For them, removing the tape from the skin would hurt too much. Some people buy a waterproof cast cover to use when bathing. If you use a waterproof cast cover, it's still a good idea to keep your cast outside the tub or shower. These covers are not completely waterproof.    If your cast gets wet, you can dry it with a hair dryer set to the cool setting. Do not use a warm or hot setting, because those settings can burn the skin. You can also use a vacuum  that has a hose to help dry your cast. Put the hose next to your cast so that you suck wet air out of the cast.    What are other ways I can take care of my cast?  To take care of your cast, you can:    ?Keep your cast clean and avoid getting dirt or sand inside it    ?Not put anything inside your cast    ?Not put powder or lotion on the skin near your cast    ?Not pull the lining out from inside the cast    ?Cover your cast when you eat, so that it doesn't get dirty    What if I have pain under my cast during the first few days?  If you have pain during the first few days, you can:    ?Put ice on the cast – Use a cold gel pack, bag of ice, or bag of frozen vegetables every 1 to 2 hours, for 15 minutes each time. Do not put the ice (or other cold object) directly on your skin.    ?Keep your cast raised (for example, on pillows) to help reduce swelling – To reduce swelling and pain, your cast needs to be raised above the level of your heart.    ?Take medicine to relieve your pain – If your doctor prescribed pain-relieving medicine, you can take that. You can also ask your doctor or nurse about taking over-the-counter medicines, such as acetaminophen (sample brand name: Tylenol) or ibuprofen (sample brand names: Advil, Motrin).    What if the skin under my cast itches?  If your skin itches, you can use a hair dryer set to the cool setting to blow air inside the cast. Do not put anything in your cast to scratch the skin.    Should I see a doctor or nurse?  See your doctor or nurse right away if:    ?You have severe pain or pain that is getting worse    ?You have sores or cuts on the skin under the cast    ?Your cast smells bad, feels too tight, or cracks    ?You have swelling that causes pain    ?You are unable to move your fingers or toes    ?Your fingers or toes are blue or cold    ?Your cast becomes soaking wet or you are unable to dry it  Patient education: Fractures (The Basics)  View in Vietnamese  Written by the doctors and editors at [facility]  What is a fracture?  A "fracture" is another word for a broken bone. There are different kinds of fractures, depending on how the bone breaks. When a bone breaks, it might crack, break all the way through, or shatter. If a broken bone sticks out of the skin or can be seen through a wound, doctors call it an "open" fracture. If the bone does not stick out of the skin or cannot be seen through a wound, doctors call it a "closed" fracture.    People with a condition called osteoporosis have a higher chance of getting a fracture. That's because osteoporosis makes a person's bones weak. This condition is especially common in older women.    What are the symptoms of a fracture?  Symptoms depend on which bone breaks and the kind of break it is. Common symptoms can include:    ?Pain, swelling, or bruising over the area    ?The area looking abnormal, bent, or not the usual shape    ?Not being able to move or put weight on that part of the body    ?Numbness in the area of the broken bone    Is there a test for a fracture?  Yes. Your doctor or nurse will ask about your symptoms, do an exam, and take an X-ray. He or she might do other imaging tests, such as a CT, bone scan, or ultrasound. Imaging tests create pictures of the inside of the body.    How are fractures treated?  Treatment depends, in part, on the type of fracture you have and how serious it is. The goal of treatment is to have the ends of the broken bone line up with each other so that the bone can heal.    If the ends of your broken bone are already in line with each other, your doctor will put a cast, splint, or brace on that part of the body. The cast, splint, or brace will keep your bone in the correct position so that it can heal.    If the ends of your broken bone are not in line with each other, your doctor will need to line them up. To do this, he or she can move your bone to the correct position without doing surgery, and then put a cast, splint, or brace on.    He or she can also do surgery to put your bone back in the correct position. This can involve:    ?Using screws, pins, rods, or plates to fix a bone inside the body    ?Putting pins or screws through the skin and into the bone, and then attaching the pins or screws to a bar that is outside the skin    Your doctor will also treat your pain. If you have a severe fracture, he or she can prescribe a strong pain medicine. If you have a mild fracture, he or she might recommend that you take an over-the-counter medicine for your pain. Over-the counter medicines include acetaminophen (sample brand name: Tylenol), ibuprofen (sample brand names: Advil, Motrin), and naproxen (sample brand name: Aleve).    After your bone heals, your doctor might recommend that you work with a physical therapist (exercise expert). The physical therapist can show you exercises and stretches to strengthen your muscles and help your joints move more easily.    How long do fractures take to heal?  It depends on the body part involved and the type of fracture. Most fractures take weeks to months to heal. Fractures in children usually heal faster than fractures in adults.    Can I do anything to improve the healing process?  Yes. It's important to follow all of your doctor's instructions while your fracture is healing. For example, he or she will probably recommend that you eat a healthy diet that includes getting enough calcium, protein, and vitamin D (figure 1 and table 1). He or she will also probably recommend that you:    ?Not play certain sports    ?Not smoke – If you smoke, it can take longer for your fracture to heal.    ?Not get your cast wet, if you have a cast that shouldn't get wet    When should I call my doctor or nurse?  After treatment, your doctor or nurse will tell you when to call him or her. In general, you should call if:    ?Your pain, swelling, or other symptoms get worse    ?You get a fever    ?You can't move part of your body    ?You get your cast wet, and it's not supposed to get wet    What can I do to prevent getting another fracture?  To protect your body from getting hurt, you can:    ?Wear safety gear when you bike, ski, rollerblade, or do activities where you could get hurt. Safety gear can include helmets, elbow pads, knee pads, and shin pads.    ?Keep walkways clear of clutter, and remove or tack down loose rugs.    ?Wear a seatbelt every time you ride or drive in a car.    ?Put a non-slip mat in the bathtub and handrails on the stairs (especially for older people).    To help your bones stay strong so that they don't break easily, you can:    ?Eat and drink foods with a lot of calcium, vitamin D, and protein. Calcium and vitamin D are nutrients that help keep bones strong.    ?Get regular exercise, even if it's just taking a daily walk.

## 2022-11-11 PROBLEM — Z91.89 OTHER SPECIFIED PERSONAL RISK FACTORS, NOT ELSEWHERE CLASSIFIED: Chronic | Status: ACTIVE | Noted: 2019-11-11

## 2022-11-14 ENCOUNTER — APPOINTMENT (OUTPATIENT)
Dept: PULMONOLOGY | Facility: CLINIC | Age: 77
End: 2022-11-14

## 2022-11-14 VITALS
SYSTOLIC BLOOD PRESSURE: 128 MMHG | HEART RATE: 88 BPM | HEIGHT: 63 IN | RESPIRATION RATE: 16 BRPM | BODY MASS INDEX: 28.88 KG/M2 | OXYGEN SATURATION: 97 % | WEIGHT: 163 LBS | DIASTOLIC BLOOD PRESSURE: 78 MMHG

## 2022-11-14 DIAGNOSIS — I25.118 ATHEROSCLEROTIC HEART DISEASE OF NATIVE CORONARY ARTERY WITH OTHER FORMS OF ANGINA PECTORIS: ICD-10-CM

## 2022-11-14 DIAGNOSIS — Z86.79 PERSONAL HISTORY OF OTHER DISEASES OF THE CIRCULATORY SYSTEM: ICD-10-CM

## 2022-11-14 DIAGNOSIS — Z86.39 PERSONAL HISTORY OF OTHER ENDOCRINE, NUTRITIONAL AND METABOLIC DISEASE: ICD-10-CM

## 2022-11-14 DIAGNOSIS — K21.9 GASTRO-ESOPHAGEAL REFLUX DISEASE W/OUT ESOPHAGITIS: ICD-10-CM

## 2022-11-14 PROCEDURE — 99204 OFFICE O/P NEW MOD 45 MIN: CPT

## 2022-11-14 RX ORDER — ICOSAPENT ETHYL 1 G/1
1 CAPSULE ORAL
Qty: 360 | Refills: 0 | Status: ACTIVE | COMMUNITY
Start: 2022-11-03

## 2022-11-14 RX ORDER — OLMESARTAN MEDOXOMIL 40 MG/1
40 TABLET, FILM COATED ORAL
Qty: 90 | Refills: 0 | Status: ACTIVE | COMMUNITY
Start: 2022-10-23

## 2022-11-14 RX ORDER — ASPIRIN 81 MG
81 TABLET, DELAYED RELEASE (ENTERIC COATED) ORAL
Refills: 0 | Status: ACTIVE | COMMUNITY

## 2022-11-14 RX ORDER — DICLOFENAC SODIUM 75 MG/1
75 TABLET, DELAYED RELEASE ORAL
Qty: 30 | Refills: 0 | Status: DISCONTINUED | COMMUNITY
Start: 2019-05-23 | End: 2022-11-14

## 2022-11-14 NOTE — PHYSICAL EXAM

## 2022-11-14 NOTE — ASSESSMENT
[FreeTextEntry1] : Patient with shortness of breath of unclear etiology.  Unlikely to have intrinsic lung disease.  Will get pulmonary function studies and I will see him back after that.  If nothing specific is found, we may send him for cardiopulmonary exercise testing.

## 2022-11-14 NOTE — HISTORY OF PRESENT ILLNESS
[TextBox_4] : The patient is a 77-year-old male who has been complaining of shortness of breath going on for about 2 years.  The patient had been very active and had asymptomatic significant coronary artery disease.  He underwent coronary artery stenting in 2019, also carotid stenting.  Since that time he has been complaining of dyspnea on exertion and also bending over.  He is a lifelong non-smoker and denies coughing or wheezing.  He had repeat cardiac stress tests which were unremarkable, as well as CT angiography of the chest which is also unremarkable.  He feels that the shortness of breath is gotten worse.  He is having it with 1 flight of stairs and also with carrying things and bending over.  Prior to this he never had an issue.  He never smoked.  He denies sleep disturbance.  His weight is stable.  BMI is 29

## 2022-12-08 NOTE — ED ADULT TRIAGE NOTE - HEIGHT IN FEET
Patient: Radha Ordonez    Procedure: Colonoscopy 92219    Date of previous(mm/dd/year):  2017    DUE: mm/dd/year:  2022    Location: [facility]    Reason for [facility]:     Physician: Any GI Physician     Diagnosis: Hx serrated adenoma    Diabetic: No    Blood Thinners: No    COVID + within the last 90 days: No results on file, did not talk to patient       Pharmacy:     Instructions sent:       I called patient and left message to call back to schedule colonoscopy.     5

## 2023-01-10 ENCOUNTER — APPOINTMENT (OUTPATIENT)
Dept: PULMONOLOGY | Facility: CLINIC | Age: 78
End: 2023-01-10
Payer: MEDICARE

## 2023-01-10 PROCEDURE — 94010 BREATHING CAPACITY TEST: CPT

## 2023-01-10 PROCEDURE — 94727 GAS DIL/WSHOT DETER LNG VOL: CPT

## 2023-01-10 PROCEDURE — 94729 DIFFUSING CAPACITY: CPT

## 2023-01-10 PROCEDURE — 85018 HEMOGLOBIN: CPT | Mod: QW

## 2023-01-12 ENCOUNTER — APPOINTMENT (OUTPATIENT)
Dept: PULMONOLOGY | Facility: CLINIC | Age: 78
End: 2023-01-12
Payer: MEDICARE

## 2023-01-12 VITALS
HEART RATE: 90 BPM | BODY MASS INDEX: 29.23 KG/M2 | HEIGHT: 63 IN | WEIGHT: 165 LBS | SYSTOLIC BLOOD PRESSURE: 140 MMHG | RESPIRATION RATE: 16 BRPM | DIASTOLIC BLOOD PRESSURE: 80 MMHG | OXYGEN SATURATION: 96 %

## 2023-01-12 DIAGNOSIS — R06.02 SHORTNESS OF BREATH: ICD-10-CM

## 2023-01-12 PROCEDURE — 99214 OFFICE O/P EST MOD 30 MIN: CPT

## 2023-01-12 NOTE — ASSESSMENT
[FreeTextEntry1] : Patient has some intermittent dyspnea on exertion.  Possibly on the basis of abdominal obesity but otherwise no intrinsic lung disease.  I reassured the patient regarding this.  He will be going to [place] for a few months and will be more active down there.  I told him that when he comes back if he is still having issues with dyspnea that is troubling him, we can get a cardiopulmonary exercise test, although most likely it will be unrevealing.

## 2023-01-12 NOTE — PROCEDURE
[FreeTextEntry1] : Pulmonary function studies are all within the normal range.  Hemoglobin is 14.6.  There are some mild obesity related volume changes.

## 2023-01-12 NOTE — HISTORY OF PRESENT ILLNESS
[TextBox_4] : Patient came in today to discuss his pulmonary function studies.  Reports some ongoing dyspnea on exertion when carrying things.  Denies cough or wheeze.

## 2023-01-17 ENCOUNTER — APPOINTMENT (OUTPATIENT)
Dept: PULMONOLOGY | Facility: CLINIC | Age: 78
End: 2023-01-17

## 2023-06-07 NOTE — PATIENT PROFILE ADULT - DO YOU FEEL UNSAFE AT WORK?
Bed in lowest position, wheels locked, appropriate side rails in place/Call bell, personal items and telephone in reach/Instruct patient to call for assistance before getting out of bed or chair/Non-slip footwear when patient is out of bed/[place] to call system/Physically safe environment - no spills, clutter or unnecessary equipment/Purposeful Proactive Rounding/Room/bathroom lighting operational, light cord in reach
not applicable

## 2024-06-28 NOTE — ED ADULT NURSE NOTE - ISOLATION TYPE:
Is This A New Presentation, Or A Follow-Up?: Skin Lesions What Type Of Note Output Would You Prefer (Optional)?: Standard Output How Severe Is Your Skin Lesion?: moderate Has Your Skin Lesion Been Treated?: not been treated None

## 2025-03-31 NOTE — PATIENT PROFILE ADULT - NSPROSPIRITUALVALUESFT_GEN_A_NUR
